# Patient Record
Sex: FEMALE | Race: BLACK OR AFRICAN AMERICAN | Employment: PART TIME | URBAN - METROPOLITAN AREA
[De-identification: names, ages, dates, MRNs, and addresses within clinical notes are randomized per-mention and may not be internally consistent; named-entity substitution may affect disease eponyms.]

---

## 2022-05-19 ENCOUNTER — HOSPITAL ENCOUNTER (EMERGENCY)
Age: 37
Discharge: HOME | End: 2022-05-19
Payer: MEDICAID

## 2022-05-19 ENCOUNTER — APPOINTMENT (OUTPATIENT)
Dept: CT IMAGING | Age: 37
End: 2022-05-19
Payer: MEDICAID

## 2022-05-19 VITALS
TEMPERATURE: 98.3 F | DIASTOLIC BLOOD PRESSURE: 78 MMHG | OXYGEN SATURATION: 100 % | SYSTOLIC BLOOD PRESSURE: 116 MMHG | RESPIRATION RATE: 20 BRPM | HEART RATE: 85 BPM

## 2022-05-19 DIAGNOSIS — R10.9 FLANK PAIN: ICD-10-CM

## 2022-05-19 DIAGNOSIS — N39.0 URINARY TRACT INFECTION WITHOUT HEMATURIA, SITE UNSPECIFIED: Primary | ICD-10-CM

## 2022-05-19 DIAGNOSIS — K59.00 CONSTIPATION, UNSPECIFIED CONSTIPATION TYPE: ICD-10-CM

## 2022-05-19 LAB
A/G RATIO: 1.5 (ref 1.1–2.2)
ALBUMIN SERPL-MCNC: 4.4 G/DL (ref 3.4–5)
ALP BLD-CCNC: 85 U/L (ref 40–129)
ALT SERPL-CCNC: 11 U/L (ref 10–40)
ANION GAP SERPL CALCULATED.3IONS-SCNC: 11 MMOL/L (ref 3–16)
AST SERPL-CCNC: 15 U/L (ref 15–37)
BACTERIA: ABNORMAL /HPF
BASOPHILS ABSOLUTE: 0 K/UL (ref 0–0.2)
BASOPHILS RELATIVE PERCENT: 0.2 %
BILIRUB SERPL-MCNC: 0.3 MG/DL (ref 0–1)
BILIRUBIN URINE: NEGATIVE
BLOOD, URINE: NEGATIVE
BUN BLDV-MCNC: 8 MG/DL (ref 7–20)
CALCIUM SERPL-MCNC: 9.4 MG/DL (ref 8.3–10.6)
CHLORIDE BLD-SCNC: 103 MMOL/L (ref 99–110)
CLARITY: ABNORMAL
CO2: 23 MMOL/L (ref 21–32)
COLOR: YELLOW
CREAT SERPL-MCNC: 0.8 MG/DL (ref 0.6–1.1)
EOSINOPHILS ABSOLUTE: 0.2 K/UL (ref 0–0.6)
EOSINOPHILS RELATIVE PERCENT: 3.9 %
EPITHELIAL CELLS, UA: 51 /HPF (ref 0–5)
GFR AFRICAN AMERICAN: >60
GFR NON-AFRICAN AMERICAN: >60
GLUCOSE BLD-MCNC: 84 MG/DL (ref 70–99)
GLUCOSE URINE: NEGATIVE MG/DL
HCG QUALITATIVE: NEGATIVE
HCT VFR BLD CALC: 38.7 % (ref 36–48)
HEMOGLOBIN: 13.2 G/DL (ref 12–16)
HYALINE CASTS: 0 /LPF (ref 0–8)
KETONES, URINE: NEGATIVE MG/DL
LEUKOCYTE ESTERASE, URINE: ABNORMAL
LYMPHOCYTES ABSOLUTE: 0.8 K/UL (ref 1–5.1)
LYMPHOCYTES RELATIVE PERCENT: 14.3 %
MCH RBC QN AUTO: 31 PG (ref 26–34)
MCHC RBC AUTO-ENTMCNC: 34 G/DL (ref 31–36)
MCV RBC AUTO: 91.1 FL (ref 80–100)
MICROSCOPIC EXAMINATION: YES
MONOCYTES ABSOLUTE: 0.5 K/UL (ref 0–1.3)
MONOCYTES RELATIVE PERCENT: 8.6 %
NEUTROPHILS ABSOLUTE: 4 K/UL (ref 1.7–7.7)
NEUTROPHILS RELATIVE PERCENT: 73 %
NITRITE, URINE: NEGATIVE
PDW BLD-RTO: 13.6 % (ref 12.4–15.4)
PH UA: 6.5 (ref 5–8)
PLATELET # BLD: 373 K/UL (ref 135–450)
PMV BLD AUTO: 7.8 FL (ref 5–10.5)
POTASSIUM SERPL-SCNC: 4.3 MMOL/L (ref 3.5–5.1)
PROTEIN UA: NEGATIVE MG/DL
RBC # BLD: 4.25 M/UL (ref 4–5.2)
RBC UA: 1 /HPF (ref 0–4)
SODIUM BLD-SCNC: 137 MMOL/L (ref 136–145)
SPECIFIC GRAVITY UA: 1.02 (ref 1–1.03)
TOTAL PROTEIN: 7.4 G/DL (ref 6.4–8.2)
URINE REFLEX TO CULTURE: YES
URINE TYPE: ABNORMAL
UROBILINOGEN, URINE: 1 E.U./DL
WBC # BLD: 5.5 K/UL (ref 4–11)
WBC UA: 15 /HPF (ref 0–5)

## 2022-05-19 PROCEDURE — 81001 URINALYSIS AUTO W/SCOPE: CPT

## 2022-05-19 PROCEDURE — 87086 URINE CULTURE/COLONY COUNT: CPT

## 2022-05-19 PROCEDURE — 85025 COMPLETE CBC W/AUTO DIFF WBC: CPT

## 2022-05-19 PROCEDURE — 36415 COLL VENOUS BLD VENIPUNCTURE: CPT

## 2022-05-19 PROCEDURE — 99284 EMERGENCY DEPT VISIT MOD MDM: CPT

## 2022-05-19 PROCEDURE — 84703 CHORIONIC GONADOTROPIN ASSAY: CPT

## 2022-05-19 PROCEDURE — 74176 CT ABD & PELVIS W/O CONTRAST: CPT

## 2022-05-19 PROCEDURE — 80053 COMPREHEN METABOLIC PANEL: CPT

## 2022-05-19 RX ORDER — CEFUROXIME AXETIL 500 MG/1
500 TABLET ORAL 2 TIMES DAILY
Qty: 14 TABLET | Refills: 0 | Status: SHIPPED | OUTPATIENT
Start: 2022-05-19 | End: 2022-05-26

## 2022-05-19 RX ORDER — POLYETHYLENE GLYCOL 3350 17 G/17G
17 POWDER, FOR SOLUTION ORAL DAILY
Qty: 238 G | Refills: 0 | Status: SHIPPED | OUTPATIENT
Start: 2022-05-19 | End: 2022-05-26

## 2022-05-19 RX ORDER — POLYETHYLENE GLYCOL 3350 17 G/17G
17 POWDER, FOR SOLUTION ORAL DAILY
Qty: 238 G | Refills: 0 | Status: SHIPPED | OUTPATIENT
Start: 2022-05-19 | End: 2022-05-19 | Stop reason: SDUPTHER

## 2022-05-19 RX ORDER — SULFAMETHOXAZOLE AND TRIMETHOPRIM 200; 40 MG/5ML; MG/5ML
160 SUSPENSION ORAL 2 TIMES DAILY
COMMUNITY

## 2022-05-19 RX ORDER — DOCUSATE SODIUM 100 MG/1
100 CAPSULE, LIQUID FILLED ORAL 2 TIMES DAILY
Qty: 60 CAPSULE | Refills: 0 | Status: SHIPPED | OUTPATIENT
Start: 2022-05-19 | End: 2022-06-18

## 2022-05-19 RX ORDER — CEFUROXIME AXETIL 500 MG/1
500 TABLET ORAL 2 TIMES DAILY
Qty: 14 TABLET | Refills: 0 | Status: SHIPPED | OUTPATIENT
Start: 2022-05-19 | End: 2022-05-19 | Stop reason: SDUPTHER

## 2022-05-19 RX ORDER — DOCUSATE SODIUM 100 MG/1
100 CAPSULE, LIQUID FILLED ORAL 2 TIMES DAILY
Qty: 60 CAPSULE | Refills: 0 | Status: SHIPPED | OUTPATIENT
Start: 2022-05-19 | End: 2022-05-19 | Stop reason: SDUPTHER

## 2022-05-19 ASSESSMENT — ENCOUNTER SYMPTOMS
DIARRHEA: 0
VOMITING: 0
WHEEZING: 0
SHORTNESS OF BREATH: 0
COUGH: 0
ABDOMINAL PAIN: 1
RHINORRHEA: 0
NAUSEA: 0

## 2022-05-19 ASSESSMENT — PAIN - FUNCTIONAL ASSESSMENT: PAIN_FUNCTIONAL_ASSESSMENT: 0-10

## 2022-05-19 ASSESSMENT — PAIN SCALES - GENERAL: PAINLEVEL_OUTOF10: 10

## 2022-05-20 NOTE — ED NOTES
Pt called ED asking for prescriptions to be called in, RN explained that provider who saw her isn't here but she is more than welcome to be re seen. Prescriptions were sent electronically by CARLY Garza last night but not at the pharmacy according to the pharmacist per pt. RN explained that electronic prescriptions sometimes take a while to cross over to their system. Pt wants to come to ED and have them physically given to her, I explained Tang her provider was not here but she was more than welcome to be re seen. Pt upset and states \"Put someone else on the phone I'm sick of listening to your shit\". Pt was transferred to Dr. Sara Roger assistant.       Kamila Bourgeois RN  05/20/22 1971

## 2022-05-20 NOTE — ED PROVIDER NOTES
[facility]        Pt Name: Sierra Nails  MRN: [id number]  [place] 1985  Date of evaluation: 5/19/2022  Provider: Tayler Flowers PA-C  PCP: Radha George MD  Note Started: 8:39 PM EDT       YAMEL. I have evaluated this patient. My supervising physician was available for consultation. CHIEF COMPLAINT       Chief Complaint   Patient presents with    Flank Pain     Patient states thinks she has a bladder/kidney infection. States urine culture came back positive for UTI. States has been on abx for 4 days. Has back and lower abd pain since last Friday. Took 2.5 mg Oxycodone about 3pm.       HISTORY OF PRESENT ILLNESS   (Location, Timing/Onset, Context/Setting, Quality, Duration, Modifying Factors, Severity, Associated Signs and Symptoms)  Note limiting factors. Chief Complaint: Flank pain, hematuria     Sierra Nails is a 29 y.o. female who presents for evaluation of concern for kidney infection versus kidney stone. Patient states that she was having some lower abdominal pain last week and was concern for urinary tract infection which time she was seen by her OB/GYN. They told her initially that her urinalysis was negative, but, called her Monday and told her the culture came back positive and started on Bactrim. States that she is having worsening of symptoms including backslash flank pain and nausea. Also reports hematuria. No fevers or chills. No vomiting or diarrhea. She has no other complaints or concerns at this time. Nursing Notes were all reviewed and agreed with or any disagreements were addressed in the HPI. REVIEW OF SYSTEMS    (2-9 systems for level 4, 10 or more for level 5)     Review of Systems   Constitutional: Negative for appetite change, chills and fever. HENT: Negative for congestion and rhinorrhea. Respiratory: Negative for cough, shortness of breath and wheezing. Cardiovascular: Negative for chest pain. Gastrointestinal: Positive for abdominal pain. Negative for diarrhea, nausea and vomiting. Genitourinary: Positive for flank pain and hematuria. Negative for difficulty urinating and dysuria. Musculoskeletal: Negative for neck pain and neck stiffness. Skin: Negative for rash. Neurological: Negative for headaches. Positives and Pertinent negatives as per HPI. Except as noted above in the ROS, all other systems were reviewed and negative. PAST MEDICAL HISTORY     Past Medical History:   Diagnosis Date    Headache(784.0)     Seasonal allergies          SURGICAL HISTORY     Past Surgical History:   Procedure Laterality Date    BUNIONECTOMY Bilateral 03.27.2013    SILVER BUNIONECTOMY BILATERAL, RIGHT FOOT FIRST         CURRENTMEDICATIONS       Previous Medications    BUTALBITAL-ACETAMINOPHEN-CAFFEINE (FIORICET) PER TABLET    Take 1 tablet by mouth every 4 hours as needed for Pain for 10 days. BUTALBITAL-ACETAMINOPHEN-CAFFEINE (FIORICET) PER TABLET    Take 1 tablet by mouth every 4 hours as needed for Pain for 10 doses. LEVONORGESTREL (MIRENA) 20 MCG/24HR IUD    1 each by Intrauterine route once. PROMETHAZINE (PHENERGAN) 25 MG TABLET    Take 0.5-1 tablets by mouth every 6 hours as needed for Nausea for 10 doses. SULFAMETHOXAZOLE-TRIMETHOPRIM (BACTRIM;SEPTRA) 200-40 MG/5ML SUSPENSION    Take 160 mg by mouth 2 times daily         ALLERGIES     Doxycycline    FAMILYHISTORY     History reviewed. No pertinent family history.        SOCIAL HISTORY       Social History     Tobacco Use    Smoking status: Never Smoker    Smokeless tobacco: Never Used   Substance Use Topics    Alcohol use: Yes     Comment: rarely    Drug use: No       SCREENINGS    Bronson Coma Scale  Eye Opening: Spontaneous  Best Verbal Response: Oriented  Best Motor Response: Obeys commands  [place] Coma Scale Score: 15        PHYSICAL EXAM    (up to 7 for level 4, 8 or more for level 5)     ED Triage Vitals [05/19/22 1937]   BP Temp Temp Source Pulse Resp SpO2 Height Weight   116/78 98.3 °F (36.8 °C) Oral 85 20 100 % -- --       Physical Exam  Vitals and nursing note reviewed. Constitutional:       Appearance: She is well-developed. She is not diaphoretic. HENT:      Head: Normocephalic and atraumatic. Right Ear: External ear normal.      Left Ear: External ear normal.      Nose: Nose normal.   Eyes:      General:         Right eye: No discharge. Left eye: No discharge. Cardiovascular:      Rate and Rhythm: Normal rate and regular rhythm. Heart sounds: Normal heart sounds. Pulmonary:      Effort: Pulmonary effort is normal. No respiratory distress. Breath sounds: Normal breath sounds. Chest:      Chest wall: No tenderness. Abdominal:      General: There is no distension. Palpations: Abdomen is soft. Tenderness: There is no abdominal tenderness. There is no right CVA tenderness, left CVA tenderness, guarding or rebound. Musculoskeletal:         General: Normal range of motion. Cervical back: Normal range of motion and neck supple. Skin:     General: Skin is warm and dry. Neurological:      Mental Status: She is alert and oriented to person, place, and time.    Psychiatric:         Behavior: Behavior normal.         DIAGNOSTIC RESULTS   LABS:    Labs Reviewed   CBC WITH AUTO DIFFERENTIAL - Abnormal; Notable for the following components:       Result Value    Lymphocytes Absolute 0.8 (*)     All other components within normal limits   URINALYSIS WITH REFLEX TO CULTURE - Abnormal; Notable for the following components:    Clarity, UA CLOUDY (*)     Leukocyte Esterase, Urine SMALL (*)     All other components within normal limits   MICROSCOPIC URINALYSIS - Abnormal; Notable for the following components:    Bacteria, UA 4+ (*)     WBC, UA 15 (*)     Epithelial Cells, UA 51 (*)     All other components within normal limits   CULTURE, URINE COMPREHENSIVE METABOLIC PANEL   HCG, SERUM, QUALITATIVE       When ordered only abnormal lab results are displayed. All other labs were within normal range or not returned as of this dictation. EKG: When ordered, EKG's are interpreted by the Emergency Department Physician in the absence of a cardiologist.  Please see their note for interpretation of EKG. RADIOLOGY:   Non-plain film images such as CT, Ultrasound and MRI are read by the radiologist. Plain radiographic images are visualized and preliminarily interpreted by the ED Provider with the below findings:        Interpretation per the Radiologist below, if available at the time of this note:    CT ABDOMEN PELVIS WO CONTRAST Additional Contrast? None   Final Result   No evidence for any renal calculi or hydronephrosis. Moderate amount of fecal material in colon, suggesting constipation. IUD in the uterus. RECOMMENDATIONS:   Small ovarian simple-appearing cyst. No follow-up imaging is recommended. Reference: JACR 2020 Feb;17(2):248-254           No results found. PROCEDURES   Unless otherwise noted below, none     Procedures    CRITICAL CARE TIME       CONSULTS:  None      EMERGENCY DEPARTMENT COURSE and DIFFERENTIAL DIAGNOSIS/MDM:   Vitals:    Vitals:    05/19/22 1937   BP: 116/78   Pulse: 85   Resp: 20   Temp: 98.3 °F (36.8 °C)   TempSrc: Oral   SpO2: 100%       Patient was given the following medications:  Medications - No data to display      Is this patient to be included in the SEP-1 Core Measure due to severe sepsis or septic shock? No   Exclusion criteria - the patient is NOT to be included for SEP-1 Core Measure due to:  2+ SIRS criteria are not met    Patient presents for evaluation of flank pain and hematuria. On exam, she is resting comfortably in bed talking on the phone in no acute distress and nontoxic. Vitals are stable and she is afebrile.   Abdomen is benign with no focal reproducible tenderness or peritoneal signs.  There is no CVA tenderness. CBC and CMP are unremarkable. Analysis positive for leukocytes, 4+ bacteria and 15 white blood cells. She also has 51 epithelial cells. This could be due to contamination, her, due to continued urinary symptoms and flank pain she will be treated empirically for possible pyelonephritis. Encouraged to discontinue Bactrim and was started on Ceftin. She was also given prescriptions for Colace and MiraLAX due to findings of constipation noted on CT. No evidence of obstructive uropathy. I see nothing that would suggest an acute abdomen at this time. Based on history, physical exam, risk factors, and tests my suspicion for bowel obstruction, incarcerated hernia, acute pancreatitis, intra-abdominal abscess, perforated viscus, diverticulitis, cholecystitis, appendicitis, PID, ovarian torsion, ectopic pregnancy and tubo-ovarian abscess is very low. There is no evidence of peritonitis, sepsis or toxicity at this time. I feel the patient can be managed as an outpatient with follow-up with her family doctor in 24-48 hours. Instructions have been given for the patient to return to the ED for worsening of the pain, high fevers, intractable vomiting, or bleeding. She is agreeable to this plan and stable for discharge at this time. FINAL IMPRESSION      1. Urinary tract infection without hematuria, site unspecified    2. Flank pain    3.  Constipation, unspecified constipation type          DISPOSITION/PLAN   DISPOSITION Decision To Discharge 05/19/2022 10:05:35 PM      PATIENT REFERRED TO:  Liss Lazaro MD  [address] [place] [id number]    Call   For a re-check in  2-3  days    [facility] Emergency Department  [address] [place] [address]  [phone]  Go to   If symptoms worsen      DISCHARGE MEDICATIONS:  New Prescriptions    CEFUROXIME (CEFTIN) 500 MG TABLET    Take 1 tablet by mouth 2 times daily for 7 days    DOCUSATE SODIUM (COLACE) 100 MG CAPSULE    Take 1 capsule by mouth 2 times daily    POLYETHYLENE GLYCOL (MIRALAX) 17 GM/SCOOP POWDER    Take 17 g by mouth daily for 7 days PRN constipation       DISCONTINUED MEDICATIONS:  Discontinued Medications    No medications on file              (Please note that portions of this note were completed with a voice recognition program.  Efforts were made to edit the dictations but occasionally words are mis-transcribed.)    Gloria Ortiz PA-C (electronically signed)           Sofía Quarles PA-C  05/19/22 [id number]

## 2022-05-21 LAB — URINE CULTURE, ROUTINE: NORMAL

## 2023-01-05 ENCOUNTER — OFFICE VISIT (OUTPATIENT)
Dept: FAMILY MEDICINE CLINIC | Age: 38
End: 2023-01-05
Payer: MEDICAID

## 2023-01-05 VITALS
BODY MASS INDEX: 34.12 KG/M2 | DIASTOLIC BLOOD PRESSURE: 68 MMHG | TEMPERATURE: 98.2 F | HEIGHT: 65 IN | WEIGHT: 204.8 LBS | SYSTOLIC BLOOD PRESSURE: 102 MMHG | HEART RATE: 102 BPM | RESPIRATION RATE: 16 BRPM | OXYGEN SATURATION: 98 %

## 2023-01-05 DIAGNOSIS — D36.7 DERMOID CYST OF HEAD: Primary | ICD-10-CM

## 2023-01-05 PROCEDURE — G8417 CALC BMI ABV UP PARAM F/U: HCPCS | Performed by: FAMILY MEDICINE

## 2023-01-05 PROCEDURE — 99203 OFFICE O/P NEW LOW 30 MIN: CPT | Performed by: FAMILY MEDICINE

## 2023-01-05 PROCEDURE — 1036F TOBACCO NON-USER: CPT | Performed by: FAMILY MEDICINE

## 2023-01-05 PROCEDURE — G8427 DOCREV CUR MEDS BY ELIG CLIN: HCPCS | Performed by: FAMILY MEDICINE

## 2023-01-05 PROCEDURE — G8484 FLU IMMUNIZE NO ADMIN: HCPCS | Performed by: FAMILY MEDICINE

## 2023-01-05 SDOH — ECONOMIC STABILITY: FOOD INSECURITY: WITHIN THE PAST 12 MONTHS, THE FOOD YOU BOUGHT JUST DIDN'T LAST AND YOU DIDN'T HAVE MONEY TO GET MORE.: NEVER TRUE

## 2023-01-05 SDOH — ECONOMIC STABILITY: FOOD INSECURITY: WITHIN THE PAST 12 MONTHS, YOU WORRIED THAT YOUR FOOD WOULD RUN OUT BEFORE YOU GOT MONEY TO BUY MORE.: NEVER TRUE

## 2023-01-05 ASSESSMENT — ENCOUNTER SYMPTOMS
SORE THROAT: 0
EYE ITCHING: 0
EYES NEGATIVE: 1
ABDOMINAL PAIN: 0
WHEEZING: 0
SHORTNESS OF BREATH: 0
EYE PAIN: 0
COUGH: 0
SINUS PRESSURE: 0
RHINORRHEA: 0

## 2023-01-05 ASSESSMENT — PATIENT HEALTH QUESTIONNAIRE - PHQ9
2. FEELING DOWN, DEPRESSED OR HOPELESS: 0
SUM OF ALL RESPONSES TO PHQ QUESTIONS 1-9: 0
1. LITTLE INTEREST OR PLEASURE IN DOING THINGS: 0
SUM OF ALL RESPONSES TO PHQ9 QUESTIONS 1 & 2: 0
SUM OF ALL RESPONSES TO PHQ QUESTIONS 1-9: 0

## 2023-01-05 ASSESSMENT — SOCIAL DETERMINANTS OF HEALTH (SDOH): HOW HARD IS IT FOR YOU TO PAY FOR THE VERY BASICS LIKE FOOD, HOUSING, MEDICAL CARE, AND HEATING?: NOT HARD AT ALL

## 2023-01-05 NOTE — PROGRESS NOTES
Migdalia Castro (:  1985) is a 40 y.o. female,New patient, here for evaluation of the following chief complaint(s):  New Patient, Other (Painful knot on L side of head: noticed a few days ago ), and Blood Work          Subjective   SUBJECTIVE/OBJECTIVE:  HPI  79-year-old female patient here to discuss a knot on her head. She states she noticed a small palpable knot on the left side of the head which is causing shooting pain down her neck and upper head. The knot was not there before. She also wants to schedule for a physical.    Review of Systems   Constitutional: Negative. Negative for fatigue. HENT:  Negative for congestion, ear pain, rhinorrhea, sinus pressure and sore throat. Eyes: Negative. Negative for pain and itching. Respiratory:  Negative for cough, shortness of breath and wheezing. Cardiovascular:  Negative for chest pain and palpitations. Gastrointestinal:  Negative for abdominal pain. Genitourinary:  Negative for frequency and urgency. Musculoskeletal:  Negative for gait problem. Skin:  Negative for rash. Kont on the left side of the head   Neurological:  Negative for dizziness and headaches. Psychiatric/Behavioral:  The patient is not nervous/anxious. Objective   Physical Exam  Constitutional:       Appearance: Normal appearance. HENT:      Head: Normocephalic and atraumatic. Comments: Small palpable knot on the left side of the head. Cardiovascular:      Rate and Rhythm: Normal rate and regular rhythm. Pulmonary:      Effort: Pulmonary effort is normal.      Breath sounds: Normal breath sounds. No wheezing. Neurological:      Mental Status: She is alert. Psychiatric:         Mood and Affect: Mood normal.                   ASSESSMENT/PLAN:  1.  Dermoid cyst of head  -     US HEAD NECK SOFT TISSUE THYROID; Future      Return in about 1 week (around 2023) for physical.           An electronic signature was used to authenticate this note.    --Dede Sparks MD     This note was generated completely or in part utilizing Dragon dictation speech recognition software. Occasionally, words are mistranscribed and despite editing, the text may contain inaccuracies due to incorrect word recognition.   If further clarification is needed please contact the office at [phone]

## 2023-01-10 ENCOUNTER — HOSPITAL ENCOUNTER (OUTPATIENT)
Dept: ULTRASOUND IMAGING | Age: 38
Discharge: HOME | End: 2023-01-10
Payer: MEDICAID

## 2023-01-10 DIAGNOSIS — D36.7 DERMOID CYST OF HEAD: ICD-10-CM

## 2023-01-10 PROCEDURE — 76536 US EXAM OF HEAD AND NECK: CPT

## 2023-01-12 ENCOUNTER — OFFICE VISIT (OUTPATIENT)
Dept: FAMILY MEDICINE CLINIC | Age: 38
End: 2023-01-12

## 2023-01-12 VITALS
RESPIRATION RATE: 16 BRPM | DIASTOLIC BLOOD PRESSURE: 74 MMHG | BODY MASS INDEX: 33.89 KG/M2 | HEART RATE: 72 BPM | OXYGEN SATURATION: 99 % | TEMPERATURE: 98.4 F | SYSTOLIC BLOOD PRESSURE: 102 MMHG | HEIGHT: 65 IN | WEIGHT: 203.4 LBS

## 2023-01-12 DIAGNOSIS — Z00.00 ANNUAL PHYSICAL EXAM: ICD-10-CM

## 2023-01-12 DIAGNOSIS — G43.709 CHRONIC MIGRAINE WITHOUT AURA WITHOUT STATUS MIGRAINOSUS, NOT INTRACTABLE: ICD-10-CM

## 2023-01-12 DIAGNOSIS — D36.7 DERMOID CYST OF HEAD: ICD-10-CM

## 2023-01-12 DIAGNOSIS — L30.8 OTHER ECZEMA: Primary | ICD-10-CM

## 2023-01-12 LAB
A/G RATIO: 1.5 (ref 1.1–2.2)
ALBUMIN SERPL-MCNC: 4.1 G/DL (ref 3.4–5)
ALP BLD-CCNC: 66 U/L (ref 40–129)
ALT SERPL-CCNC: 10 U/L (ref 10–40)
ANION GAP SERPL CALCULATED.3IONS-SCNC: 12 MMOL/L (ref 3–16)
AST SERPL-CCNC: 17 U/L (ref 15–37)
BASOPHILS ABSOLUTE: 0 K/UL (ref 0–0.2)
BASOPHILS RELATIVE PERCENT: 0.4 %
BILIRUB SERPL-MCNC: <0.2 MG/DL (ref 0–1)
BUN BLDV-MCNC: 14 MG/DL (ref 7–20)
CALCIUM SERPL-MCNC: 8.6 MG/DL (ref 8.3–10.6)
CHLORIDE BLD-SCNC: 104 MMOL/L (ref 99–110)
CHOLESTEROL, TOTAL: 165 MG/DL (ref 0–199)
CO2: 21 MMOL/L (ref 21–32)
CREAT SERPL-MCNC: 0.7 MG/DL (ref 0.6–1.1)
EOSINOPHILS ABSOLUTE: 0.1 K/UL (ref 0–0.6)
EOSINOPHILS RELATIVE PERCENT: 2.6 %
GFR SERPL CREATININE-BSD FRML MDRD: >60 ML/MIN/{1.73_M2}
GLUCOSE BLD-MCNC: 79 MG/DL (ref 70–99)
HCT VFR BLD CALC: 39.1 % (ref 36–48)
HDLC SERPL-MCNC: 61 MG/DL (ref 40–60)
HEMOGLOBIN: 13 G/DL (ref 12–16)
LDL CHOLESTEROL CALCULATED: 97 MG/DL
LYMPHOCYTES ABSOLUTE: 1.2 K/UL (ref 1–5.1)
LYMPHOCYTES RELATIVE PERCENT: 28 %
MCH RBC QN AUTO: 31 PG (ref 26–34)
MCHC RBC AUTO-ENTMCNC: 33.2 G/DL (ref 31–36)
MCV RBC AUTO: 93.3 FL (ref 80–100)
MONOCYTES ABSOLUTE: 0.2 K/UL (ref 0–1.3)
MONOCYTES RELATIVE PERCENT: 5.7 %
NEUTROPHILS ABSOLUTE: 2.7 K/UL (ref 1.7–7.7)
NEUTROPHILS RELATIVE PERCENT: 63.3 %
PDW BLD-RTO: 13.8 % (ref 12.4–15.4)
PLATELET # BLD: 290 K/UL (ref 135–450)
PMV BLD AUTO: 8.3 FL (ref 5–10.5)
POTASSIUM SERPL-SCNC: 4.5 MMOL/L (ref 3.5–5.1)
RBC # BLD: 4.19 M/UL (ref 4–5.2)
SODIUM BLD-SCNC: 137 MMOL/L (ref 136–145)
TOTAL PROTEIN: 6.8 G/DL (ref 6.4–8.2)
TRIGL SERPL-MCNC: 33 MG/DL (ref 0–150)
TSH SERPL DL<=0.05 MIU/L-ACNC: 1.59 UIU/ML (ref 0.27–4.2)
VLDLC SERPL CALC-MCNC: 7 MG/DL
WBC # BLD: 4.2 K/UL (ref 4–11)

## 2023-01-12 PROCEDURE — 36415 COLL VENOUS BLD VENIPUNCTURE: CPT | Performed by: FAMILY MEDICINE

## 2023-01-12 RX ORDER — ONDANSETRON 8 MG/1
TABLET, ORALLY DISINTEGRATING ORAL
COMMUNITY
Start: 2021-11-23 | End: 2023-01-12 | Stop reason: SDUPTHER

## 2023-01-12 RX ORDER — BUTALBITAL, ACETAMINOPHEN AND CAFFEINE 50; 325; 40 MG/1; MG/1; MG/1
1 TABLET ORAL EVERY 4 HOURS PRN
Qty: 6 TABLET | Refills: 2 | Status: SHIPPED | OUTPATIENT
Start: 2023-01-12

## 2023-01-12 RX ORDER — ONDANSETRON 8 MG/1
4 TABLET, ORALLY DISINTEGRATING ORAL EVERY 8 HOURS PRN
Qty: 12 TABLET | Refills: 2 | Status: SHIPPED | OUTPATIENT
Start: 2023-01-12 | End: 2023-01-24

## 2023-01-12 ASSESSMENT — ENCOUNTER SYMPTOMS
COUGH: 0
SHORTNESS OF BREATH: 0
EYE ITCHING: 0
EYES NEGATIVE: 1
SORE THROAT: 0
WHEEZING: 0
RHINORRHEA: 0
ABDOMINAL PAIN: 0
SINUS PRESSURE: 0
EYE PAIN: 0

## 2023-01-12 NOTE — PROGRESS NOTES
Joaquín Daniel (:  1985) is a 40 y.o. female,Established patient, here for evaluation of the following chief complaint(s):  Cyst (Follow up) and Results          Subjective   SUBJECTIVE/OBJECTIVE:  HPI  40 yr old female here for physical(documented in (separate note) and discuss test results and med refills. Ultrasound Result (most recent):  US HEAD NECK SOFT TISSUE THYROID 01/10/2023    Narrative  EXAMINATION:  THYROID ULTRASOUND    1/10/2023    COMPARISON:  None. HISTORY:  ORDERING SYSTEM PROVIDED HISTORY: Dermoid cyst of head  TECHNOLOGIST PROVIDED HISTORY:  This procedure can be scheduled via [employer]. Access your [employer] account by  visiting [email or web address]. FINDINGS:  Right thyroid lobe:  Measures 4.5 x 1.5 x 1.6 cm    Left thyroid lobe:  Measures 5.0 x 1.5 x 1.2 cm    Isthmus:  Measures 2 mm    Thyroid Gland:  Thyroid gland demonstrates normal echotexture and vascularity. Nodules: No abnormal thyroid nodules are present. Cervical lymphadenopathy: There is no definite pathologic lymphadenopathy. At the back of the left neck, near the hairline, within the subcutaneous fat  just superficial to the strap musculature, there is a nonspecific 1.8 x 1.4 x  0.4 cm elliptical hypoechoic nodule present, demonstrating a few internal  echogenic echoes and no evidence of internal color Doppler flow. Impression  1. Unremarkable sonographic appearance of the thyroid, without an abnormal  thyroid nodule or mass. 2. Nonspecific 1.8 cm elliptical hypoechoic nodule within the subcutaneous  fat of the left lower neck, likely a small benign dermoid cyst, sebaceous  cyst, or lymph node. Migraines - 2-3 times per month /controlled on fiorcet 6 tabs per month and zofran. Nees refills  Contact dermatitis- needs refills on kenalog cream.no acute flare up presently. Review of Systems   Constitutional: Negative. Negative for fatigue.    HENT:  Negative for congestion, ear pain, rhinorrhea, sinus pressure and sore throat. Eyes: Negative. Negative for pain and itching. Respiratory:  Negative for cough, shortness of breath and wheezing. Cardiovascular:  Negative for chest pain and palpitations. Gastrointestinal:  Negative for abdominal pain. Genitourinary:  Negative for frequency and urgency. Musculoskeletal:  Negative for gait problem. Skin:  Negative for rash. Neurological:  Negative for dizziness and headaches. Psychiatric/Behavioral:  The patient is not nervous/anxious. Objective   Physical Exam  Constitutional:       Appearance: Normal appearance. HENT:      Head: Normocephalic and atraumatic. Right Ear: Tympanic membrane, ear canal and external ear normal.      Left Ear: Tympanic membrane, ear canal and external ear normal.      Nose: Nose normal. No congestion or rhinorrhea. Mouth/Throat:      Mouth: Mucous membranes are moist.      Pharynx: Oropharynx is clear. No oropharyngeal exudate or posterior oropharyngeal erythema. Eyes:      Extraocular Movements: Extraocular movements intact. Conjunctiva/sclera: Conjunctivae normal.      Pupils: Pupils are equal, round, and reactive to light. Neck:      Vascular: No carotid bruit. Cardiovascular:      Rate and Rhythm: Normal rate and regular rhythm. Pulses: Normal pulses. Heart sounds: Normal heart sounds, S1 normal and S2 normal. No murmur heard. Pulmonary:      Effort: Pulmonary effort is normal. No respiratory distress. Breath sounds: Normal breath sounds. No wheezing, rhonchi or rales. Abdominal:      General: Bowel sounds are normal. There is no distension. Palpations: Abdomen is soft. Tenderness: There is no abdominal tenderness. There is no right CVA tenderness, left CVA tenderness, guarding or rebound. Musculoskeletal:         General: No swelling or tenderness. Normal range of motion. Cervical back: Normal range of motion and neck supple. No rigidity or tenderness. Right lower leg: No edema. Left lower leg: No edema. Lymphadenopathy:      Cervical: No cervical adenopathy. Skin:     General: Skin is warm and dry. Findings: No bruising or erythema. Neurological:      General: No focal deficit present. Mental Status: She is alert and oriented to person, place, and time. Psychiatric:         Mood and Affect: Mood normal.         Behavior: Behavior normal.                   ASSESSMENT/PLAN:  1. Other eczema  -     triamcinolone (KENALOG) 0.1 % ointment; Apply topically 2 times daily Apply topically 2 times daily. , Topical, 2 TIMES DAILY Starting Thu 1/12/2023, Disp-454 g, R-2, Normal  2. Chronic migraine without aura without status migrainosus, not intractable  -     butalbital-acetaminophen-caffeine (FIORICET) -40 MG per tablet; Take 1 tablet by mouth every 4 hours as needed for Headaches or Migraine, Disp-6 tablet, R-2Normal  -     ondansetron (ZOFRAN-ODT) 8 MG TBDP disintegrating tablet; Take 0.5 tablets by mouth every 8 hours as needed for Nausea or Vomiting, Disp-12 tablet, R-2Normal    3. Dermoid cyst of head  -     Balbir Chandler MD, General Surgery, [facility]      No follow-ups on file. An electronic signature was used to authenticate this note. --Rafaela Cornejo MD     This note was generated completely or in part utilizing Dragon dictation speech recognition software. Occasionally, words are mistranscribed and despite editing, the text may contain inaccuracies due to incorrect word recognition.   If further clarification is needed please contact the office at [phone]

## 2023-01-12 NOTE — PROGRESS NOTES
Ania Sanchez is a 40y.o. year old female here for:    Chief Complaint:    Chief Complaint   Patient presents with    Cyst     Follow up    Results       Subjective: Today, her current concerns include:    Physical  Pap per Gyn  Preventive Services:    Health Maintenance History:  Patient exercises regularly? yes  Diet? ok  Glasses/Eyes: Does not wear prescription glasses or lenses      Other Health Maintenance History:  Sexually active? In the past two weeks have they been bothered by feeling \"down\", depressed or hopeless?  no  In the past two weeks, have they experienced a loss of interest or pleasure in doing things?  no  In the last year, have you fallen more than once or been seriously injured in a fall?  no  Advance Directives no      Health Maintenance Screening:  Diabetes screening: was provided today  Cholesterol screening: was provided today    I  Functional Assessment  1. Do you have problems with hearing?  no  2.   Do you have problems with vision?  no  Past Medical History:   Diagnosis Date    Headache(784.0)     Seasonal allergies      Social History     Tobacco Use    Smoking status: Never    Smokeless tobacco: Never   Vaping Use    Vaping Use: Never used   Substance Use Topics    Alcohol use: Yes     Comment: rarely    Drug use: No     Family History   Problem Relation Age of Onset    Cerebral Aneurysm Father     Stroke Father      Past Surgical History:   Procedure Laterality Date    BREAST REDUCTION SURGERY  03/2021    BUNIONECTOMY Bilateral 03/27/2013    SILVER BUNIONECTOMY BILATERAL, RIGHT FOOT FIRST    WISDOM TOOTH EXTRACTION           Current Outpatient Medications:     butalbital-acetaminophen-caffeine (FIORICET) -40 MG per tablet, Take 1 tablet by mouth every 4 hours as needed for Headaches or Migraine, Disp: 6 tablet, Rfl: 2    ondansetron (ZOFRAN-ODT) 8 MG TBDP disintegrating tablet, Take 0.5 tablets by mouth every 8 hours as needed for Nausea or Vomiting, Disp: 12 tablet, Rfl: 2    triamcinolone (KENALOG) 0.1 % ointment, Apply topically 2 times daily Apply topically 2 times daily. , Disp: 454 g, Rfl: 2    levonorgestrel (MIRENA) IUD 52 mg, 1 each by Intrauterine route once.  , Disp: , Rfl:     promethazine (PHENERGAN) 25 MG tablet, Take 0.5-1 tablets by mouth every 6 hours as needed for Nausea for 10 doses. (Patient not taking: Reported on 1/12/2023), Disp: 10 tablet, Rfl: 0  Allergies   Allergen Reactions    Doxycycline Hives       Review of Systems:  Review of Systems   Constitutional: Negative. Negative for fatigue. HENT:  Negative for congestion, ear pain, rhinorrhea, sinus pressure and sore throat. Eyes: Negative. Negative for pain and itching. Respiratory:  Negative for cough, shortness of breath and wheezing. Cardiovascular:  Negative for chest pain and palpitations. Gastrointestinal:  Negative for abdominal pain. Genitourinary:  Negative for frequency and urgency. Musculoskeletal:  Negative for gait problem. Skin:  Negative for rash. Neurological:  Negative for dizziness and headaches. Psychiatric/Behavioral:  The patient is not nervous/anxious. Objective:    Physical Exam:  Vitals:    01/12/23 0836   BP: 102/74   Pulse: 72   Resp: 16   Temp: 98.4 °F (36.9 °C)   TempSrc: Temporal   SpO2: 99%   Weight: 203 lb 6.4 oz (92.3 kg)   Height: 5' 5\" (1.651 m)     Physical Exam  Constitutional:       Appearance: Normal appearance. HENT:      Head: Normocephalic and atraumatic. Right Ear: Tympanic membrane, ear canal and external ear normal.      Left Ear: Tympanic membrane, ear canal and external ear normal.      Nose: Nose normal. No congestion or rhinorrhea. Mouth/Throat:      Mouth: Mucous membranes are moist.      Pharynx: Oropharynx is clear. No oropharyngeal exudate or posterior oropharyngeal erythema. Eyes:      Extraocular Movements: Extraocular movements intact.       Conjunctiva/sclera: Conjunctivae normal.      Pupils: Pupils are equal, round, and reactive to light. Neck:      Vascular: No carotid bruit. Cardiovascular:      Rate and Rhythm: Normal rate and regular rhythm. Pulses: Normal pulses. Heart sounds: Normal heart sounds, S1 normal and S2 normal. No murmur heard. Pulmonary:      Effort: Pulmonary effort is normal. No respiratory distress. Breath sounds: Normal breath sounds. No wheezing, rhonchi or rales. Abdominal:      General: Bowel sounds are normal. There is no distension. Palpations: Abdomen is soft. Tenderness: There is no abdominal tenderness. There is no right CVA tenderness, left CVA tenderness, guarding or rebound. Musculoskeletal:         General: No swelling or tenderness. Normal range of motion. Cervical back: Normal range of motion and neck supple. No rigidity or tenderness. Right lower leg: No edema. Left lower leg: No edema. Lymphadenopathy:      Cervical: No cervical adenopathy. Skin:     General: Skin is warm and dry. Findings: No bruising or erythema. Neurological:      General: No focal deficit present. Mental Status: She is alert and oriented to person, place, and time. Psychiatric:         Mood and Affect: Mood normal.         Behavior: Behavior normal.     Body mass index is 33.85 kg/m². Labs:  No results found for this or any previous visit (from the past 672 hour(s)). Imaging:  No orders to display         ASSESSMENT & PLAN:    Dane Bloom is a 40y.o. year old female here for an annual exam. The following is a summary of the plan made at this visit:     Annual physical exam  -     Comprehensive Metabolic Panel; Future  -     CBC with Auto Differential; Future  -     TSH; Future  -     Lipid Panel; Future    Counseling  The patient was counseled r regarding diet and exercise. The patient was given information regarding the dangers of smoking and the overuse of alcohol.   The patient was counseled regarding Living Will/Durable Power-Of-.

## 2023-01-17 ENCOUNTER — TELEPHONE (OUTPATIENT)
Dept: FAMILY MEDICINE CLINIC | Age: 38
End: 2023-01-17

## 2023-01-17 DIAGNOSIS — L60.9 NAIL PROBLEM: Primary | ICD-10-CM

## 2023-08-22 DIAGNOSIS — L30.8 OTHER ECZEMA: ICD-10-CM

## 2023-08-22 NOTE — TELEPHONE ENCOUNTER
Medication:   Requested Prescriptions     Pending Prescriptions Disp Refills    triamcinolone (KENALOG) 0.1 % ointment 454 g 2     Sig: Apply topically 2 times daily Apply topically 2 times daily. Last Filled:      Patient Phone Number: [phone] (home)     Last appt: 1/12/2023   Next appt: Visit date not found    Last OARRS: No flowsheet data found.       Please advise on Ondansetron RX request 8mg

## 2023-08-23 RX ORDER — TRIAMCINOLONE ACETONIDE 1 MG/G
OINTMENT TOPICAL 2 TIMES DAILY
Qty: 454 G | Refills: 0 | Status: SHIPPED | OUTPATIENT
Start: 2023-08-23 | End: 2023-10-02 | Stop reason: SDUPTHER

## 2023-08-29 ENCOUNTER — TELEPHONE (OUTPATIENT)
Dept: FAMILY MEDICINE CLINIC | Age: 38
End: 2023-08-29

## 2023-08-29 DIAGNOSIS — G43.709 CHRONIC MIGRAINE WITHOUT AURA WITHOUT STATUS MIGRAINOSUS, NOT INTRACTABLE: ICD-10-CM

## 2023-08-29 NOTE — TELEPHONE ENCOUNTER
Medication:   Requested Prescriptions     Pending Prescriptions Disp Refills    ondansetron (ZOFRAN-ODT) 8 MG TBDP disintegrating tablet 12 tablet 2     Sig: Take 0.5 tablets by mouth every 8 hours as needed for Nausea or Vomiting        Last appt: 1/12/2023     Please advise

## 2023-08-29 NOTE — TELEPHONE ENCOUNTER
Patient called requesting for a refill on Zofran 8 MG. Patient is asking for a pharmacy change. To [facility] in [place]? I could not hear patient very well and would call back to double verify what pharmacy she would like.

## 2023-08-30 RX ORDER — ONDANSETRON 8 MG/1
4 TABLET, ORALLY DISINTEGRATING ORAL EVERY 8 HOURS PRN
Qty: 12 TABLET | Refills: 0 | Status: SHIPPED | OUTPATIENT
Start: 2023-08-30 | End: 2023-09-11

## 2023-08-30 RX ORDER — ONDANSETRON 8 MG/1
4 TABLET, ORALLY DISINTEGRATING ORAL EVERY 8 HOURS PRN
Qty: 12 TABLET | Refills: 0 | Status: CANCELLED | OUTPATIENT
Start: 2023-08-30 | End: 2023-09-11

## 2023-10-02 ENCOUNTER — OFFICE VISIT (OUTPATIENT)
Dept: PRIMARY CARE CLINIC | Age: 38
End: 2023-10-02
Payer: MEDICAID

## 2023-10-02 VITALS
TEMPERATURE: 98.1 F | HEART RATE: 86 BPM | BODY MASS INDEX: 33.62 KG/M2 | HEIGHT: 65 IN | DIASTOLIC BLOOD PRESSURE: 85 MMHG | SYSTOLIC BLOOD PRESSURE: 112 MMHG | OXYGEN SATURATION: 100 % | RESPIRATION RATE: 18 BRPM | WEIGHT: 201.8 LBS

## 2023-10-02 DIAGNOSIS — G43.019 INTRACTABLE MIGRAINE WITHOUT AURA AND WITHOUT STATUS MIGRAINOSUS: Primary | ICD-10-CM

## 2023-10-02 DIAGNOSIS — L30.8 OTHER ECZEMA: ICD-10-CM

## 2023-10-02 PROBLEM — L20.82 FLEXURAL ECZEMA: Status: ACTIVE | Noted: 2019-11-02

## 2023-10-02 PROCEDURE — G8417 CALC BMI ABV UP PARAM F/U: HCPCS | Performed by: FAMILY MEDICINE

## 2023-10-02 PROCEDURE — 99204 OFFICE O/P NEW MOD 45 MIN: CPT | Performed by: FAMILY MEDICINE

## 2023-10-02 PROCEDURE — G8484 FLU IMMUNIZE NO ADMIN: HCPCS | Performed by: FAMILY MEDICINE

## 2023-10-02 PROCEDURE — 1036F TOBACCO NON-USER: CPT | Performed by: FAMILY MEDICINE

## 2023-10-02 PROCEDURE — G8427 DOCREV CUR MEDS BY ELIG CLIN: HCPCS | Performed by: FAMILY MEDICINE

## 2023-10-02 RX ORDER — PROMETHAZINE HYDROCHLORIDE 25 MG/1
12.5-25 TABLET ORAL EVERY 6 HOURS PRN
Qty: 10 TABLET | Refills: 3 | Status: SHIPPED | OUTPATIENT
Start: 2023-10-02

## 2023-10-02 RX ORDER — TRIAMCINOLONE ACETONIDE 1 MG/G
OINTMENT TOPICAL 2 TIMES DAILY
Qty: 454 G | Refills: 2 | Status: SHIPPED | OUTPATIENT
Start: 2023-10-02

## 2023-10-02 RX ORDER — IBUPROFEN 800 MG/1
800 TABLET ORAL EVERY 8 HOURS PRN
COMMUNITY
Start: 2017-09-30

## 2023-10-02 RX ORDER — AMOXICILLIN 500 MG/1
TABLET, FILM COATED ORAL EVERY 8 HOURS
COMMUNITY

## 2023-10-02 SDOH — ECONOMIC STABILITY: FOOD INSECURITY: WITHIN THE PAST 12 MONTHS, THE FOOD YOU BOUGHT JUST DIDN'T LAST AND YOU DIDN'T HAVE MONEY TO GET MORE.: NEVER TRUE

## 2023-10-02 SDOH — ECONOMIC STABILITY: HOUSING INSECURITY
IN THE LAST 12 MONTHS, WAS THERE A TIME WHEN YOU DID NOT HAVE A STEADY PLACE TO SLEEP OR SLEPT IN A SHELTER (INCLUDING NOW)?: NO

## 2023-10-02 SDOH — ECONOMIC STABILITY: FOOD INSECURITY: WITHIN THE PAST 12 MONTHS, YOU WORRIED THAT YOUR FOOD WOULD RUN OUT BEFORE YOU GOT MONEY TO BUY MORE.: NEVER TRUE

## 2023-10-02 SDOH — ECONOMIC STABILITY: INCOME INSECURITY: HOW HARD IS IT FOR YOU TO PAY FOR THE VERY BASICS LIKE FOOD, HOUSING, MEDICAL CARE, AND HEATING?: NOT HARD AT ALL

## 2023-10-02 NOTE — PROGRESS NOTES
Subjective:   Patient ID: Rani Anderson is a 41 y.o. female here today to establish care. Previously under the care of Adell Sicard MD  HPI by clinical support staff:   Chief Complaint   Patient presents with    [facility] form: located on top in black basket         Preliminary data above this line collected by clinical support staff.    ______________________________________________________________________  HPI by Provider:   HPI   Patient presents to establish care. History reviewed and updated with patient today. History of migraines since Age  21 years   Sometimes back of neck, occiput or temples  Quality-Tightness, throbbing ,dull varies. Lasts from 2-7 days. Pain rated 9/10 - tries to stay in dark. Frequency twice weekly but varies. Usually phonosensitive and Photosensitive. Has Nausea no vomiting. No blurry vision or aura, no blind spots. Eye exam this year normal.  Father history of migraines   Tried sumatriptan didn't like it. Takes Fioricet 2 tabs twice a week. Data above this line collected by Provider. Patient's medications, allergies, past medical, surgical, social and family histories were reviewed and updated as appropriate.   Patient Care Team:  Magalie Smith MD as PCP - General (Family Medicine)  Magalie Smith MD as PCP - Empaneled Provider    Allergies   Allergen Reactions    Doxycycline Hives    Hornet Venom Hives    Wasp Venom Protein Hives    Yellow Jacket Venom Hives       Current Outpatient Medications on File Prior to Visit   Medication Sig Dispense Refill    ibuprofen (ADVIL;MOTRIN) 800 MG tablet Take 1 tablet by mouth every 8 hours as needed      butalbital-acetaminophen-caffeine (FIORICET) -40 MG per tablet Take 1 tablet by mouth every 4 hours as needed for Headaches or Migraine 6 tablet 2    levonorgestrel (MIRENA) IUD 52 mg 1 each by IntraUTERine route once      amoxicillin (AMOXIL) 500 MG tablet every 8 (eight) hours       No current facility-administered

## 2023-10-04 ASSESSMENT — ENCOUNTER SYMPTOMS
CONSTIPATION: 0
ABDOMINAL PAIN: 0
SHORTNESS OF BREATH: 0
DIARRHEA: 0
SORE THROAT: 0
CHEST TIGHTNESS: 0
RHINORRHEA: 0

## 2023-12-13 ENCOUNTER — TELEPHONE (OUTPATIENT)
Dept: PRIMARY CARE CLINIC | Age: 38
End: 2023-12-13

## 2023-12-13 DIAGNOSIS — G43.709 CHRONIC MIGRAINE WITHOUT AURA WITHOUT STATUS MIGRAINOSUS, NOT INTRACTABLE: ICD-10-CM

## 2023-12-13 NOTE — TELEPHONE ENCOUNTER
Medication:   Requested Prescriptions     Pending Prescriptions Disp Refills    ondansetron (ZOFRAN-ODT) 8 MG TBDP disintegrating tablet 12 tablet 0     Sig: Take 0.5 tablets by mouth every 8 hours as needed for Nausea or Vomiting        Last Filled:  8/30/23 #12 0 refills    Patient Phone Number: [phone] (home)     Last appt: 10/2/2023   Next appt: Visit date not found

## 2023-12-13 NOTE — TELEPHONE ENCOUNTER
Medication and Quantity requested:   ondansetron (ZOFRAN-ODT) 8 MG TBDP disintegrating tablet       Last Visit  10/2/23    Pharmacy and phone number updated in EPIC:  yes  [facility]

## 2023-12-14 RX ORDER — ONDANSETRON 8 MG/1
4 TABLET, ORALLY DISINTEGRATING ORAL EVERY 8 HOURS PRN
Qty: 30 TABLET | Refills: 1 | Status: SHIPPED | OUTPATIENT
Start: 2023-12-14

## 2023-12-27 ENCOUNTER — OFFICE VISIT (OUTPATIENT)
Dept: PRIMARY CARE CLINIC | Age: 38
End: 2023-12-27
Payer: MEDICAID

## 2023-12-27 VITALS
BODY MASS INDEX: 34.47 KG/M2 | HEART RATE: 72 BPM | HEIGHT: 65 IN | TEMPERATURE: 98.2 F | WEIGHT: 206.9 LBS | DIASTOLIC BLOOD PRESSURE: 80 MMHG | RESPIRATION RATE: 16 BRPM | OXYGEN SATURATION: 99 % | SYSTOLIC BLOOD PRESSURE: 113 MMHG

## 2023-12-27 DIAGNOSIS — H69.92 DYSFUNCTION OF LEFT EUSTACHIAN TUBE: Primary | ICD-10-CM

## 2023-12-27 PROCEDURE — G8417 CALC BMI ABV UP PARAM F/U: HCPCS | Performed by: NURSE PRACTITIONER

## 2023-12-27 PROCEDURE — G8484 FLU IMMUNIZE NO ADMIN: HCPCS | Performed by: NURSE PRACTITIONER

## 2023-12-27 PROCEDURE — 99213 OFFICE O/P EST LOW 20 MIN: CPT | Performed by: NURSE PRACTITIONER

## 2023-12-27 PROCEDURE — G8427 DOCREV CUR MEDS BY ELIG CLIN: HCPCS | Performed by: NURSE PRACTITIONER

## 2023-12-27 PROCEDURE — 1036F TOBACCO NON-USER: CPT | Performed by: NURSE PRACTITIONER

## 2023-12-27 NOTE — PROGRESS NOTES
Brandi Rose (:  1985) is a 38 y.o. female,Established patient, here for evaluation of the following chief complaint(s):  Other (Left ear pain, sore this past week. She thinks she has an ear infection possibly)         ASSESSMENT/PLAN:  1. Dysfunction of left eustachian tube  Advised to monitor and use nasal spray as needed.    No follow-ups on file.         Subjective   SUBJECTIVE/OBJECTIVE:  HPI  Patient complains of ear pain and possible ear infection. Symptoms include left ear pain. Onset of symptoms was 2 days ago, gradually worsening since that time.   Review of Systems   Constitutional: Negative.    HENT:  Positive for ear pain.    Eyes: Negative.    Respiratory: Negative.     Cardiovascular: Negative.    Gastrointestinal: Negative.    Endocrine: Negative.    Genitourinary: Negative.    Musculoskeletal: Negative.    Skin: Negative.    Neurological: Negative.    Hematological: Negative.    Psychiatric/Behavioral: Negative.            Objective   Physical Exam  HENT:      Right Ear: Tympanic membrane and ear canal normal.      Left Ear: Tympanic membrane and ear canal normal.      Nose: Nose normal.      Mouth/Throat:      Mouth: Mucous membranes are moist.   Eyes:      Extraocular Movements: Extraocular movements intact.   Cardiovascular:      Rate and Rhythm: Normal rate.      Pulses: Normal pulses.      Heart sounds: Normal heart sounds.   Pulmonary:      Effort: Pulmonary effort is normal.      Breath sounds: Normal breath sounds.   Abdominal:      General: Bowel sounds are normal.      Palpations: Abdomen is soft.   Musculoskeletal:         General: Normal range of motion.      Cervical back: Normal range of motion.   Skin:     General: Skin is warm.   Neurological:      General: No focal deficit present.      Mental Status: She is alert and oriented to person, place, and time.   Psychiatric:         Mood and Affect: Mood normal.         Behavior: Behavior normal.         Thought Content:

## 2024-01-05 DIAGNOSIS — G43.709 CHRONIC MIGRAINE WITHOUT AURA WITHOUT STATUS MIGRAINOSUS, NOT INTRACTABLE: ICD-10-CM

## 2024-01-05 RX ORDER — BUTALBITAL, ACETAMINOPHEN AND CAFFEINE 50; 325; 40 MG/1; MG/1; MG/1
1 TABLET ORAL EVERY 4 HOURS PRN
Qty: 6 TABLET | Refills: 2 | Status: CANCELLED | OUTPATIENT
Start: 2024-01-05

## 2024-01-05 RX ORDER — BUTALBITAL, ACETAMINOPHEN AND CAFFEINE 50; 325; 40 MG/1; MG/1; MG/1
1 TABLET ORAL EVERY 4 HOURS PRN
Qty: 30 TABLET | Refills: 2 | Status: SHIPPED | OUTPATIENT
Start: 2024-01-05

## 2024-01-05 RX ORDER — IBUPROFEN 800 MG/1
800 TABLET ORAL EVERY 8 HOURS PRN
Qty: 120 TABLET | Refills: 0 | Status: SHIPPED | OUTPATIENT
Start: 2024-01-05

## 2024-01-05 NOTE — TELEPHONE ENCOUNTER
Medication:   Requested Prescriptions     Pending Prescriptions Disp Refills    ibuprofen (ADVIL;MOTRIN) 800 MG tablet 120 tablet      Sig: Take 1 tablet by mouth every 8 hours as needed      Last appt: 12/27/2023   Next appt: Visit date not found

## 2024-01-18 ASSESSMENT — ENCOUNTER SYMPTOMS
EYES NEGATIVE: 1
RESPIRATORY NEGATIVE: 1
GASTROINTESTINAL NEGATIVE: 1

## 2024-03-07 ENCOUNTER — OFFICE VISIT (OUTPATIENT)
Dept: PRIMARY CARE CLINIC | Age: 39
End: 2024-03-07
Payer: MEDICAID

## 2024-03-07 DIAGNOSIS — A09 TRAVELER'S DIARRHEA: Primary | ICD-10-CM

## 2024-03-07 DIAGNOSIS — Z29.89 NEED FOR MALARIA PROPHYLAXIS: ICD-10-CM

## 2024-03-07 PROCEDURE — G8417 CALC BMI ABV UP PARAM F/U: HCPCS | Performed by: FAMILY MEDICINE

## 2024-03-07 PROCEDURE — G8427 DOCREV CUR MEDS BY ELIG CLIN: HCPCS | Performed by: FAMILY MEDICINE

## 2024-03-07 PROCEDURE — 1036F TOBACCO NON-USER: CPT | Performed by: FAMILY MEDICINE

## 2024-03-07 PROCEDURE — G8484 FLU IMMUNIZE NO ADMIN: HCPCS | Performed by: FAMILY MEDICINE

## 2024-03-07 PROCEDURE — 99214 OFFICE O/P EST MOD 30 MIN: CPT | Performed by: FAMILY MEDICINE

## 2024-03-07 RX ORDER — AZITHROMYCIN 500 MG/1
1000 TABLET, FILM COATED ORAL ONCE
Qty: 4 TABLET | Refills: 0 | Status: SHIPPED | OUTPATIENT
Start: 2024-03-07 | End: 2024-03-07

## 2024-03-07 RX ORDER — ATOVAQUONE AND PROGUANIL HYDROCHLORIDE 250; 100 MG/1; MG/1
1 TABLET, FILM COATED ORAL DAILY
Qty: 22 TABLET | Refills: 0 | Status: SHIPPED | OUTPATIENT
Start: 2024-03-07

## 2024-03-07 ASSESSMENT — ENCOUNTER SYMPTOMS
DIARRHEA: 0
SHORTNESS OF BREATH: 0
RHINORRHEA: 0
CONSTIPATION: 0
ABDOMINAL PAIN: 0
CHEST TIGHTNESS: 0
SORE THROAT: 0

## 2024-03-07 ASSESSMENT — PATIENT HEALTH QUESTIONNAIRE - PHQ9
SUM OF ALL RESPONSES TO PHQ QUESTIONS 1-9: 0
SUM OF ALL RESPONSES TO PHQ QUESTIONS 1-9: 0
1. LITTLE INTEREST OR PLEASURE IN DOING THINGS: 0
SUM OF ALL RESPONSES TO PHQ QUESTIONS 1-9: 0
SUM OF ALL RESPONSES TO PHQ QUESTIONS 1-9: 0
SUM OF ALL RESPONSES TO PHQ9 QUESTIONS 1 & 2: 0
2. FEELING DOWN, DEPRESSED OR HOPELESS: 0

## 2024-03-07 NOTE — PROGRESS NOTES
Subjective:   Patient ID: Brandi Rose is a 38 y.o. female.  HPI by clinical support staff:   Chief Complaint   Patient presents with    Medication Check      Preliminary data above this line collected by clinical support staff.    ______________________________________________________________________  HPI by Provider:   HPI   Patient presents virtually for travel advise- traveling to [place] in 5 days for 2 weeks. Wants to know about vaccines/ medication  requirements. Going with mother and sister. Does not vaccinate. Medication allergies updated. Also wants travelers diarrhea  medication.   Data above this line collected by Provider.    Patient's medications, allergies, past medical, surgical, social and family histories were reviewed and updated as appropriate.  Patient Care Team:  Joya Damon MD as PCP - General (Family Medicine)  [facility]  Current Outpatient Medications on File Prior to Visit   Medication Sig Dispense Refill    ibuprofen (ADVIL;MOTRIN) 800 MG tablet Take 1 tablet by mouth every 8 hours as needed for Pain 120 tablet 0    butalbital-acetaminophen-caffeine (FIORICET) -40 MG per tablet Take 1 tablet by mouth every 4 hours as needed for Headaches or Migraine 30 tablet 2    ondansetron (ZOFRAN-ODT) 8 MG TBDP disintegrating tablet Take 0.5 tablets by mouth every 8 hours as needed for Nausea or Vomiting 30 tablet 1    promethazine (PHENERGAN) 25 MG tablet Take 0.5-1 tablets by mouth every 6 hours as needed for Nausea 10 tablet 3    triamcinolone (KENALOG) 0.1 % ointment Apply topically 2 times daily Apply topically 2 times daily. 454 g 2    levonorgestrel (MIRENA) IUD 52 mg 1 each by IntraUTERine route once       No current facility-administered medications on file prior to visit.     Review of Systems   Constitutional:  Negative for activity change, appetite change, fatigue and fever.   HENT:  Negative for congestion, rhinorrhea and sore

## 2024-03-08 ENCOUNTER — OFFICE VISIT (OUTPATIENT)
Age: 39
End: 2024-03-08

## 2024-03-08 VITALS
HEART RATE: 105 BPM | OXYGEN SATURATION: 95 % | DIASTOLIC BLOOD PRESSURE: 73 MMHG | WEIGHT: 205.8 LBS | TEMPERATURE: 100.5 F | BODY MASS INDEX: 34.29 KG/M2 | HEIGHT: 65 IN | SYSTOLIC BLOOD PRESSURE: 102 MMHG

## 2024-03-08 DIAGNOSIS — J10.1 INFLUENZA B: Primary | ICD-10-CM

## 2024-03-08 DIAGNOSIS — G43.909 MIGRAINE SYNDROME: ICD-10-CM

## 2024-03-08 DIAGNOSIS — R50.9 FEVER, UNSPECIFIED FEVER CAUSE: ICD-10-CM

## 2024-03-08 DIAGNOSIS — L30.8 OTHER ECZEMA: ICD-10-CM

## 2024-03-08 LAB
INFLUENZA A ANTIGEN, POC: NEGATIVE
INFLUENZA B ANTIGEN, POC: POSITIVE
Lab: NORMAL
PERFORMING INSTRUMENT: NORMAL
QC PASS/FAIL: NORMAL
SARS-COV-2, POC: NORMAL

## 2024-03-08 RX ORDER — OSELTAMIVIR PHOSPHATE 75 MG/1
75 CAPSULE ORAL 2 TIMES DAILY
Qty: 10 CAPSULE | Refills: 0 | Status: SHIPPED | OUTPATIENT
Start: 2024-03-08 | End: 2024-03-13

## 2024-03-08 RX ORDER — KETOROLAC TROMETHAMINE 30 MG/ML
30 INJECTION, SOLUTION INTRAMUSCULAR; INTRAVENOUS ONCE
Status: COMPLETED | OUTPATIENT
Start: 2024-03-08 | End: 2024-03-08

## 2024-03-08 RX ORDER — TRIAMCINOLONE ACETONIDE 1 MG/G
OINTMENT TOPICAL 2 TIMES DAILY
Qty: 454 G | Refills: 2 | Status: SHIPPED | OUTPATIENT
Start: 2024-03-08

## 2024-03-08 RX ADMIN — KETOROLAC TROMETHAMINE 30 MG: 30 INJECTION, SOLUTION INTRAMUSCULAR; INTRAVENOUS at 16:40

## 2024-03-08 ASSESSMENT — ENCOUNTER SYMPTOMS
ABDOMINAL PAIN: 0
RHINORRHEA: 1
SHORTNESS OF BREATH: 0
VOMITING: 0
DIARRHEA: 0
COUGH: 1
TROUBLE SWALLOWING: 0
NAUSEA: 0

## 2024-03-08 ASSESSMENT — VISUAL ACUITY: OU: 1

## 2024-03-08 NOTE — PATIENT INSTRUCTIONS
New Prescriptions    OSELTAMIVIR (TAMIFLU) 75 MG CAPSULE    Take 1 capsule by mouth 2 times daily for 5 days     Increase fluid intake.  Ibuprofen and Tylenol for fever or pain (If not contraindicated).  Follow up with PCP in 5 days or sooner for worsening symptoms.

## 2024-03-08 NOTE — TELEPHONE ENCOUNTER
Medication and Quantity requested: triamcinolone (KENALOG) 0.1 % ointmen      Last Visit  3/7/24    Pharmacy and phone number updated in [facility]:  yes  [facility]

## 2024-03-08 NOTE — PROGRESS NOTES
Brandi Rose (:  1985) is a 38 y.o. female, New patient, here for evaluation of the following chief complaint(s):  Cough, Fever, and Headache (Pt here for cough, headache and fever x 2-3 days.)    ASSESSMENT/PLAN:    ICD-10-CM    1. Influenza B  J10.1 POCT Influenza A/B Antigen (BD Veritor)     oseltamivir (TAMIFLU) 75 MG capsule      2. Fever, unspecified fever cause  R50.9 POCT Influenza A/B Antigen (BD Veritor)     POCT COVID-19, Antigen      3. Migraine syndrome  G43.909 oseltamivir (TAMIFLU) 75 MG capsule     ketorolac (TORADOL) injection 30 mg        POC testing: Discussed result(s) with patient  Results for POC orders placed in visit on 24   POCT Influenza A/B Antigen (BD Veritor)   Result Value Ref Range    Inflenza A Ag NEGATIVE     Influenza B Ag POSITIVE      Ddx includes: URI, Influenza A/B, Sinusitis, Pneumonia, Migraine   Disposition: Pt is 37yo female here with URI s/s. Febrile with tachycardia. VSS. Physical Exam as below. Flu B positive. Tamiflu rx sent to her pharmacy.    Migraine: Pt appears to have a migraine headache based on history and examination. Headache typical for patient in regards to location, severity, and associated symptoms. No neurological deficits or traumatic etiology noted at this time. No red flags. 30mg IM Toradol given in the office and pt was monitored for 15min with no complications.   Reviewed AVS with patient. All questions answered. If symptoms worsen go to the Emergency Department. Follow up in clinic or with PCP as needed. Patient is agreeable with plan.      SUBJECTIVE/OBJECTIVE:  37yo female here with C/O fever, chills, cough, congestion, bodyaches and migraine. Endorses sick contacts. Kids have same URI s/s at home. Care prior to arrival includes Tylenol.     Pt c/o her Migraine acting up and wants some pain relief for her HA. Denies any other neurologic symptoms. No change in ha pattern. Takes Fioricet for her Migraines.       History provided by:

## 2024-04-04 ENCOUNTER — OFFICE VISIT (OUTPATIENT)
Dept: PRIMARY CARE CLINIC | Age: 39
End: 2024-04-04
Payer: MEDICAID

## 2024-04-04 VITALS
WEIGHT: 204.2 LBS | HEART RATE: 75 BPM | RESPIRATION RATE: 16 BRPM | BODY MASS INDEX: 34.02 KG/M2 | OXYGEN SATURATION: 100 % | TEMPERATURE: 97.5 F | HEIGHT: 65 IN | SYSTOLIC BLOOD PRESSURE: 117 MMHG | DIASTOLIC BLOOD PRESSURE: 74 MMHG

## 2024-04-04 DIAGNOSIS — G43.709 CHRONIC MIGRAINE WITHOUT AURA WITHOUT STATUS MIGRAINOSUS, NOT INTRACTABLE: ICD-10-CM

## 2024-04-04 PROCEDURE — G8417 CALC BMI ABV UP PARAM F/U: HCPCS | Performed by: NURSE PRACTITIONER

## 2024-04-04 PROCEDURE — 99213 OFFICE O/P EST LOW 20 MIN: CPT | Performed by: NURSE PRACTITIONER

## 2024-04-04 PROCEDURE — 1036F TOBACCO NON-USER: CPT | Performed by: NURSE PRACTITIONER

## 2024-04-04 PROCEDURE — G8427 DOCREV CUR MEDS BY ELIG CLIN: HCPCS | Performed by: NURSE PRACTITIONER

## 2024-04-04 RX ORDER — BUTALBITAL, ACETAMINOPHEN AND CAFFEINE 50; 325; 40 MG/1; MG/1; MG/1
1 TABLET ORAL EVERY 4 HOURS PRN
Qty: 30 TABLET | Refills: 2 | Status: SHIPPED | OUTPATIENT
Start: 2024-04-04

## 2024-04-04 RX ORDER — ONDANSETRON 8 MG/1
4 TABLET, ORALLY DISINTEGRATING ORAL EVERY 8 HOURS PRN
Qty: 30 TABLET | Refills: 1 | Status: SHIPPED | OUTPATIENT
Start: 2024-04-04

## 2024-04-04 NOTE — PROGRESS NOTES
Brandi Rose (:  1985) is a 38 y.o. female,Established patient, here for evaluation of the following chief complaint(s):  FMLA (Form)         ASSESSMENT/PLAN:  1. Chronic migraine without aura without status migrainosus, not intractable  -     ondansetron (ZOFRAN-ODT) 8 MG TBDP disintegrating tablet; Take 0.5 tablets by mouth every 8 hours as needed for Nausea or Vomiting, Disp-30 tablet, R-1Normal  -     butalbital-acetaminophen-caffeine (FIORICET) -40 MG per tablet; Take 1 tablet by mouth every 4 hours as needed for Headaches or Migraine, Disp-30 tablet, R-2Normal      No follow-ups on file.         Subjective   SUBJECTIVE/OBJECTIVE:  HPI  Patient presents for medication refill for migraines and also  for short term disability for migraines.  Patient stated that she has been having frequent migraines and needs short-term disability forms filled out for when she has episodes to help accommodate her.  Review of Systems   Constitutional: Negative.    HENT: Negative.     Eyes: Negative.    Respiratory: Negative.     Cardiovascular: Negative.    Gastrointestinal: Negative.    Endocrine: Negative.    Genitourinary: Negative.    Musculoskeletal: Negative.    Skin: Negative.    Neurological:  Positive for headaches.   Hematological: Negative.    Psychiatric/Behavioral: Negative.            Objective   Physical Exam  HENT:      Right Ear: Tympanic membrane and ear canal normal.      Left Ear: Tympanic membrane and ear canal normal.      Nose: Nose normal.      Mouth/Throat:      Mouth: Mucous membranes are moist.   Eyes:      Extraocular Movements: Extraocular movements intact.   Cardiovascular:      Rate and Rhythm: Normal rate.      Pulses: Normal pulses.      Heart sounds: Normal heart sounds.   Pulmonary:      Effort: Pulmonary effort is normal.      Breath sounds: Normal breath sounds.   Abdominal:      General: Bowel sounds are normal.      Palpations: Abdomen is soft.   Musculoskeletal:

## 2024-04-19 ASSESSMENT — ENCOUNTER SYMPTOMS
RESPIRATORY NEGATIVE: 1
EYES NEGATIVE: 1
GASTROINTESTINAL NEGATIVE: 1

## 2024-05-28 ENCOUNTER — TELEPHONE (OUTPATIENT)
Dept: ADMINISTRATIVE | Age: 39
End: 2024-05-28

## 2024-05-28 NOTE — TELEPHONE ENCOUNTER
Submitted PA for Promethazine HCl 25MG tablets  Via CMM Key: [id number]  STATUS: PA Not Required    If this requires a response please respond to the pool ( P MHCX [facility] MEDICATION PRE-AUTH).      Thank you please advise patient.

## 2024-06-18 ENCOUNTER — TELEMEDICINE (OUTPATIENT)
Dept: PRIMARY CARE CLINIC | Age: 39
End: 2024-06-18
Payer: MEDICAID

## 2024-06-18 DIAGNOSIS — L70.0 ACNE VULGARIS: Primary | ICD-10-CM

## 2024-06-18 PROCEDURE — 1036F TOBACCO NON-USER: CPT | Performed by: NURSE PRACTITIONER

## 2024-06-18 PROCEDURE — 99213 OFFICE O/P EST LOW 20 MIN: CPT | Performed by: NURSE PRACTITIONER

## 2024-06-18 PROCEDURE — G8417 CALC BMI ABV UP PARAM F/U: HCPCS | Performed by: NURSE PRACTITIONER

## 2024-06-18 PROCEDURE — G8427 DOCREV CUR MEDS BY ELIG CLIN: HCPCS | Performed by: NURSE PRACTITIONER

## 2024-06-18 RX ORDER — SPIRONOLACTONE 25 MG/1
25 TABLET ORAL 2 TIMES DAILY
Qty: 90 TABLET | Refills: 1 | Status: SHIPPED | OUTPATIENT
Start: 2024-06-18

## 2024-06-18 ASSESSMENT — ENCOUNTER SYMPTOMS
GASTROINTESTINAL NEGATIVE: 1
RESPIRATORY NEGATIVE: 1
ROS SKIN COMMENTS: ACNE
EYES NEGATIVE: 1

## 2024-06-18 NOTE — PROGRESS NOTES
Brandi Rose, was evaluated through a synchronous (real-time) audio-video encounter. The patient (or guardian if applicable) is aware that this is a billable service, which includes applicable co-pays. This Virtual Visit was conducted with patient's (and/or legal guardian's) consent. Patient identification was verified, and a caregiver was present when appropriate.   The patient was located at Home: [address] [facility] [address] [facility] [address]  Provider was located at Home (Appt Dept State): [place]  Confirm you are appropriately licensed, registered, or certified to deliver care in the state where the patient is located as indicated above. If you are not or unsure, please re-schedule the visit: Yes, I confirm.     Brandi Rose (:  1985) is a Established patient, presenting virtually for evaluation of the following:    Assessment & Plan   Below is the assessment and plan developed based on review of pertinent history, physical exam, labs, studies, and medications.  1. Acne vulgaris  -     spironolactone (ALDACTONE) 25 MG tablet; Take 1 tablet by mouth 2 times daily, Disp-90 tablet, R-1Normal    No follow-ups on file.       Subjective   HPI  Patient presents with complaints of acne.  She stated that she has always struggled with acne but lately it has been worsening and all over-the-counter treatment options have been exhausted without any relief.  She wanted to try spironolactone and wanted to see if it was contraindicated with the current medications that she is on  Review of Systems   Constitutional: Negative.    HENT: Negative.     Eyes: Negative.    Respiratory: Negative.     Cardiovascular: Negative.    Gastrointestinal: Negative.    Endocrine: Negative.    Genitourinary: Negative.    Musculoskeletal: Negative.    Skin: Negative.         Acne   Neurological: Negative.    Hematological: Negative.    Psychiatric/Behavioral: Negative.            Objective   Patient-Reported Vitals  No data recorded

## 2024-06-23 ENCOUNTER — HOSPITAL ENCOUNTER (EMERGENCY)
Age: 39
Discharge: HOME | End: 2024-06-23
Attending: STUDENT IN AN ORGANIZED HEALTH CARE EDUCATION/TRAINING PROGRAM
Payer: MEDICAID

## 2024-06-23 VITALS
HEART RATE: 93 BPM | DIASTOLIC BLOOD PRESSURE: 83 MMHG | OXYGEN SATURATION: 100 % | SYSTOLIC BLOOD PRESSURE: 109 MMHG | BODY MASS INDEX: 31.49 KG/M2 | RESPIRATION RATE: 20 BRPM | HEIGHT: 65 IN | WEIGHT: 189 LBS | TEMPERATURE: 98.4 F

## 2024-06-23 DIAGNOSIS — R11.2 NAUSEA VOMITING AND DIARRHEA: Primary | ICD-10-CM

## 2024-06-23 DIAGNOSIS — R19.7 NAUSEA VOMITING AND DIARRHEA: Primary | ICD-10-CM

## 2024-06-23 LAB
ALBUMIN SERPL-MCNC: 4.2 G/DL (ref 3.4–5)
ALP SERPL-CCNC: 69 U/L (ref 40–129)
ALT SERPL-CCNC: 15 U/L (ref 10–40)
ANION GAP SERPL CALCULATED.3IONS-SCNC: 9 MMOL/L (ref 3–16)
AST SERPL-CCNC: 20 U/L (ref 15–37)
BACTERIA URNS QL MICRO: ABNORMAL /HPF
BASOPHILS # BLD: 0 K/UL (ref 0–0.2)
BASOPHILS NFR BLD: 0.5 %
BILIRUB DIRECT SERPL-MCNC: <0.2 MG/DL (ref 0–0.3)
BILIRUB INDIRECT SERPL-MCNC: NORMAL MG/DL (ref 0–1)
BILIRUB SERPL-MCNC: 0.3 MG/DL (ref 0–1)
BILIRUB UR QL STRIP.AUTO: NEGATIVE
BUN SERPL-MCNC: 7 MG/DL (ref 7–20)
CALCIUM SERPL-MCNC: 8.8 MG/DL (ref 8.3–10.6)
CHLORIDE SERPL-SCNC: 109 MMOL/L (ref 99–110)
CLARITY UR: CLEAR
CO2 SERPL-SCNC: 21 MMOL/L (ref 21–32)
COLOR UR: YELLOW
CREAT SERPL-MCNC: 0.6 MG/DL (ref 0.6–1.1)
DEPRECATED RDW RBC AUTO: 13.8 % (ref 12.4–15.4)
EOSINOPHIL # BLD: 0 K/UL (ref 0–0.6)
EOSINOPHIL NFR BLD: 0.7 %
EPI CELLS #/AREA URNS HPF: ABNORMAL /HPF (ref 0–5)
GFR SERPLBLD CREATININE-BSD FMLA CKD-EPI: >90 ML/MIN/{1.73_M2}
GLUCOSE SERPL-MCNC: 81 MG/DL (ref 70–99)
GLUCOSE UR STRIP.AUTO-MCNC: NEGATIVE MG/DL
HCG UR QL: NEGATIVE
HCT VFR BLD AUTO: 38 % (ref 36–48)
HGB BLD-MCNC: 12.7 G/DL (ref 12–16)
HGB UR QL STRIP.AUTO: NEGATIVE
KETONES UR STRIP.AUTO-MCNC: NEGATIVE MG/DL
LEUKOCYTE ESTERASE UR QL STRIP.AUTO: NEGATIVE
LIPASE SERPL-CCNC: 26 U/L (ref 13–60)
LYMPHOCYTES # BLD: 1.3 K/UL (ref 1–5.1)
LYMPHOCYTES NFR BLD: 20.9 %
MCH RBC QN AUTO: 30.9 PG (ref 26–34)
MCHC RBC AUTO-ENTMCNC: 33.4 G/DL (ref 31–36)
MCV RBC AUTO: 92.4 FL (ref 80–100)
MONOCYTES # BLD: 0.5 K/UL (ref 0–1.3)
MONOCYTES NFR BLD: 7.4 %
MUCOUS THREADS #/AREA URNS LPF: ABNORMAL /LPF
NEUTROPHILS # BLD: 4.5 K/UL (ref 1.7–7.7)
NEUTROPHILS NFR BLD: 70.5 %
NITRITE UR QL STRIP.AUTO: NEGATIVE
PH UR STRIP.AUTO: 7 [PH] (ref 5–8)
PLATELET # BLD AUTO: 407 K/UL (ref 135–450)
PMV BLD AUTO: 7.4 FL (ref 5–10.5)
POTASSIUM SERPL-SCNC: 4.3 MMOL/L (ref 3.5–5.1)
PROT SERPL-MCNC: 7.2 G/DL (ref 6.4–8.2)
PROT UR STRIP.AUTO-MCNC: ABNORMAL MG/DL
RBC # BLD AUTO: 4.12 M/UL (ref 4–5.2)
RBC #/AREA URNS HPF: ABNORMAL /HPF (ref 0–4)
SODIUM SERPL-SCNC: 139 MMOL/L (ref 136–145)
SP GR UR STRIP.AUTO: 1.02 (ref 1–1.03)
UA COMPLETE W REFLEX CULTURE PNL UR: ABNORMAL
UA DIPSTICK W REFLEX MICRO PNL UR: YES
URN SPEC COLLECT METH UR: ABNORMAL
UROBILINOGEN UR STRIP-ACNC: 1 E.U./DL
WBC # BLD AUTO: 6.4 K/UL (ref 4–11)
WBC #/AREA URNS HPF: ABNORMAL /HPF (ref 0–5)

## 2024-06-23 PROCEDURE — 99284 EMERGENCY DEPT VISIT MOD MDM: CPT

## 2024-06-23 PROCEDURE — 81001 URINALYSIS AUTO W/SCOPE: CPT

## 2024-06-23 PROCEDURE — 83690 ASSAY OF LIPASE: CPT

## 2024-06-23 PROCEDURE — 2580000003 HC RX 258: Performed by: PHYSICIAN ASSISTANT

## 2024-06-23 PROCEDURE — 36415 COLL VENOUS BLD VENIPUNCTURE: CPT

## 2024-06-23 PROCEDURE — 80048 BASIC METABOLIC PNL TOTAL CA: CPT

## 2024-06-23 PROCEDURE — 85025 COMPLETE CBC W/AUTO DIFF WBC: CPT

## 2024-06-23 PROCEDURE — 80076 HEPATIC FUNCTION PANEL: CPT

## 2024-06-23 PROCEDURE — 84703 CHORIONIC GONADOTROPIN ASSAY: CPT

## 2024-06-23 RX ORDER — SODIUM CHLORIDE, SODIUM LACTATE, POTASSIUM CHLORIDE, AND CALCIUM CHLORIDE .6; .31; .03; .02 G/100ML; G/100ML; G/100ML; G/100ML
1000 INJECTION, SOLUTION INTRAVENOUS ONCE
Status: COMPLETED | OUTPATIENT
Start: 2024-06-23 | End: 2024-06-23

## 2024-06-23 RX ADMIN — SODIUM CHLORIDE, POTASSIUM CHLORIDE, SODIUM LACTATE AND CALCIUM CHLORIDE 1000 ML: 600; 310; 30; 20 INJECTION, SOLUTION INTRAVENOUS at 12:24

## 2024-06-23 ASSESSMENT — PAIN - FUNCTIONAL ASSESSMENT: PAIN_FUNCTIONAL_ASSESSMENT: 0-10

## 2024-06-23 ASSESSMENT — LIFESTYLE VARIABLES
HOW OFTEN DO YOU HAVE A DRINK CONTAINING ALCOHOL: NEVER
HOW MANY STANDARD DRINKS CONTAINING ALCOHOL DO YOU HAVE ON A TYPICAL DAY: PATIENT DOES NOT DRINK

## 2024-06-23 ASSESSMENT — PAIN SCALES - GENERAL: PAINLEVEL_OUTOF10: 0

## 2024-06-23 NOTE — ED PROVIDER NOTES
[facility]  EMERGENCY DEPARTMENT ENCOUNTER          PHYSICIAN ASSISTANT NOTE       Date of evaluation: 6/23/2024    Chief Complaint     Nausea and Diarrhea (Patient comes to the ED today for Diarrhea and nausea times two weeks. Patient states that she has her good and bad days. Patient was advised to come to the ED for further evaluation. )    History of Present Illness     Brandi Rose is a 39 y.o. female who presents to the emergency department with a past medical history of migraines, eczema who presents emergency department with a chief complaint of nausea and diarrhea for the past 12 days.  The patient states that she remembers that this initially started when she cooked a pork sausage that she states was completely cooked and had some corn and for the next several days she had diarrhea and nonbloody, nonbilious emesis.  Patient states that she took some Imodium and had been taking some Zofran/Phenergan that she has prescribed for her migraines and started to feel better.  Then she states that a couple days later she had all the symptoms come back.  She states that yesterday she took an Imodium and has not had diarrhea since.  She has had some nausea but has not taken medications since yesterday.  The patient states that her symptoms seem to be intermittent.  She only has abdominal pain when she is vomiting or having a bowel movement.  At rest currently she does not have any abdominal pain. No fevers or chills.  No chest pain or shortness of breath.  No urinary symptoms such as dysuria or urinary frequency.  No abnormal vaginal discharge.  No concerns for pregnancy or STDs.  She has an IUD in place.  Denies any blood in stool or vomit during the entire course of her symptoms.  She denies any recent antibiotic use within the past several months.  She states that in March she traveled to [place] but has not had any recent travel between that time.    ASSESSMENT / PLAN  (MEDICAL DECISION MAKING)

## 2024-06-23 NOTE — ED PROVIDER NOTES
ED Attending Attestation Note     Date of evaluation: 6/23/2024    This patient was seen by the advance practice provider.  I have seen and examined the patient, agree with the workup, evaluation, management and diagnosis. The care plan has been discussed.  My assessment reveals 39-year-old female who presents with 2 weeks of diarrhea.  Patient has been taking Imodium at home.  Has not had any diarrhea today.  Abdomen is soft nontender nondistended.  Labs including metabolic panel, hepatic function panel, CBC, urinalysis, pregnancy test unremarkable.  Benign abdomen.  Possible resolution of her symptoms.  Unable to provide stool sample here in the ED.  Counseled her on follow-up with primary care provider return precaution discussed with the patient.  Stable for outpatient..    Medical Decision Making  Problems Addressed:  Nausea vomiting and diarrhea: complicated acute illness or injury that poses a threat to life or bodily functions    Amount and/or Complexity of Data Reviewed  Labs: ordered. Decision-making details documented in ED Course.    Risk  Prescription drug management.  Decision regarding hospitalization.             Jaylan Dai MD  06/23/24 1843

## 2024-06-23 NOTE — DISCHARGE INSTRUCTIONS
You are seen in the emergency department with nausea, diarrhea and vomiting.    Your labs look very reassuring no evidence for significant dehydration.  You are doing very well with rehydrating yourself.    Your urine sample does not look infected and your pregnancy test is negative.  Liver and pancreas testing look reassuring.  Your blood counts are also reassuring.    Would recommend that you follow-up with your primary care doctor as they may want to do stool studies when you are able to provide a sample.     Otherwise feel you are stable for discharge.  Continue taking Zofran as needed.  You have 1 refill on your prescription that was filled.

## 2024-08-01 ENCOUNTER — TELEMEDICINE (OUTPATIENT)
Dept: PRIMARY CARE CLINIC | Age: 39
End: 2024-08-01
Payer: MEDICAID

## 2024-08-01 DIAGNOSIS — M25.572 ACUTE LEFT ANKLE PAIN: Primary | ICD-10-CM

## 2024-08-01 PROCEDURE — G8417 CALC BMI ABV UP PARAM F/U: HCPCS | Performed by: NURSE PRACTITIONER

## 2024-08-01 PROCEDURE — G8427 DOCREV CUR MEDS BY ELIG CLIN: HCPCS | Performed by: NURSE PRACTITIONER

## 2024-08-01 PROCEDURE — 1036F TOBACCO NON-USER: CPT | Performed by: NURSE PRACTITIONER

## 2024-08-01 PROCEDURE — 99213 OFFICE O/P EST LOW 20 MIN: CPT | Performed by: NURSE PRACTITIONER

## 2024-08-01 NOTE — PROGRESS NOTES
Brandi Rose, was evaluated through a synchronous (real-time) audio-video encounter. The patient (or guardian if applicable) is aware that this is a billable service, which includes applicable co-pays. This Virtual Visit was conducted with patient's (and/or legal guardian's) consent. Patient identification was verified, and a caregiver was present when appropriate.   The patient was located at Home: [address] [facility] [address]  Provider was located at Facility (Appt Dept): [address] [place] [address] [place] [address]  Confirm you are appropriately licensed, registered, or certified to deliver care in the state where the patient is located as indicated above. If you are not or unsure, please re-schedule the visit: Yes, I confirm.     Brandi Rose (:  1985) is a Established patient, presenting virtually for evaluation of the following:    Assessment & Plan   Below is the assessment and plan developed based on review of pertinent history, physical exam, labs, studies, and medications.  1. Acute left ankle pain  -     XR ANKLE LEFT (2 VIEWS); Future    No follow-ups on file.       Subjective   HPI  Patient presents with complains of left ankle pain; she stated she also needed a letter to be able to wear comfortable shoes for her job; patient stated ankle pain has been ongoing for a couple of weeks and doesn't recall any trauma that could have caused that.  Review of Systems   Constitutional: Negative.    HENT: Negative.     Eyes: Negative.    Respiratory: Negative.     Cardiovascular: Negative.    Gastrointestinal: Negative.    Endocrine: Negative.    Genitourinary: Negative.    Musculoskeletal: Negative.         Joint pain   Skin: Negative.    Neurological: Negative.    Hematological: Negative.    Psychiatric/Behavioral: Negative.            Objective   Patient-Reported Vitals  Patient-Reported Weight: 190  Patient-Reported Height: 5'5\"       Physical Exam  [INSTRUCTIONS:

## 2024-08-09 ENCOUNTER — HOSPITAL ENCOUNTER (OUTPATIENT)
Dept: GENERAL RADIOLOGY | Age: 39
Discharge: HOME | End: 2024-08-09
Payer: MEDICAID

## 2024-08-09 ENCOUNTER — HOSPITAL ENCOUNTER (OUTPATIENT)
Age: 39
Discharge: HOME | End: 2024-08-09
Payer: MEDICAID

## 2024-08-09 DIAGNOSIS — G43.709 CHRONIC MIGRAINE WITHOUT AURA WITHOUT STATUS MIGRAINOSUS, NOT INTRACTABLE: ICD-10-CM

## 2024-08-09 DIAGNOSIS — M25.572 ACUTE LEFT ANKLE PAIN: ICD-10-CM

## 2024-08-09 PROCEDURE — 73610 X-RAY EXAM OF ANKLE: CPT

## 2024-08-09 NOTE — TELEPHONE ENCOUNTER
Medication:   Requested Prescriptions     Pending Prescriptions Disp Refills    ibuprofen (ADVIL;MOTRIN) 800 MG tablet 120 tablet 0     Sig: Take 1 tablet by mouth every 8 hours as needed for Pain    ondansetron (ZOFRAN-ODT) 8 MG TBDP disintegrating tablet 30 tablet 1     Sig: Take 0.5 tablets by mouth every 8 hours as needed for Nausea or Vomiting        Last Filled:  01/05/24, 04/04/2024    Patient Phone Number: [phone] (home)     Last appt: 8/1/2024   Next appt: Visit date not found    Last OARRS:        No data to display

## 2024-08-12 RX ORDER — ONDANSETRON 8 MG/1
4 TABLET, ORALLY DISINTEGRATING ORAL EVERY 8 HOURS PRN
Qty: 30 TABLET | Refills: 1 | Status: SHIPPED | OUTPATIENT
Start: 2024-08-12

## 2024-08-12 RX ORDER — IBUPROFEN 800 MG/1
800 TABLET ORAL EVERY 8 HOURS PRN
Qty: 120 TABLET | Refills: 0 | Status: SHIPPED | OUTPATIENT
Start: 2024-08-12

## 2024-08-12 ASSESSMENT — ENCOUNTER SYMPTOMS
EYES NEGATIVE: 1
RESPIRATORY NEGATIVE: 1
GASTROINTESTINAL NEGATIVE: 1

## 2024-09-10 ENCOUNTER — TELEPHONE (OUTPATIENT)
Dept: PRIMARY CARE CLINIC | Age: 39
End: 2024-09-10

## 2024-09-27 ENCOUNTER — TELEPHONE (OUTPATIENT)
Dept: ADMINISTRATIVE | Age: 39
End: 2024-09-27

## 2024-09-27 DIAGNOSIS — L30.8 OTHER ECZEMA: ICD-10-CM

## 2024-09-27 DIAGNOSIS — G43.019 INTRACTABLE MIGRAINE WITHOUT AURA AND WITHOUT STATUS MIGRAINOSUS: ICD-10-CM

## 2024-09-27 RX ORDER — TRIAMCINOLONE ACETONIDE 1 MG/G
OINTMENT TOPICAL 2 TIMES DAILY
Qty: 454 G | Refills: 2 | Status: SHIPPED | OUTPATIENT
Start: 2024-09-27

## 2024-09-27 RX ORDER — PROMETHAZINE HYDROCHLORIDE 25 MG/1
12.5-25 TABLET ORAL EVERY 6 HOURS PRN
Qty: 10 TABLET | Refills: 3 | Status: SHIPPED | OUTPATIENT
Start: 2024-09-27

## 2024-09-27 RX ORDER — IBUPROFEN 800 MG/1
800 TABLET, FILM COATED ORAL EVERY 8 HOURS PRN
Qty: 120 TABLET | Refills: 0 | Status: SHIPPED | OUTPATIENT
Start: 2024-09-27

## 2024-09-27 NOTE — TELEPHONE ENCOUNTER
Submitted PA for Spironolactone 25MG tablets  Via CMM Key: [id number] STATUS: PA Not Required    If this requires a response please respond to the pool ( P [facility] MEDICATION PRE-AUTH).      Thank you please advise patient.

## 2024-12-17 ENCOUNTER — TELEMEDICINE (OUTPATIENT)
Dept: PRIMARY CARE CLINIC | Age: 39
End: 2024-12-17
Payer: MEDICAID

## 2024-12-17 DIAGNOSIS — J40 BRONCHITIS: Primary | ICD-10-CM

## 2024-12-17 PROCEDURE — 1036F TOBACCO NON-USER: CPT | Performed by: NURSE PRACTITIONER

## 2024-12-17 PROCEDURE — 99213 OFFICE O/P EST LOW 20 MIN: CPT | Performed by: NURSE PRACTITIONER

## 2024-12-17 PROCEDURE — G8484 FLU IMMUNIZE NO ADMIN: HCPCS | Performed by: NURSE PRACTITIONER

## 2024-12-17 PROCEDURE — G8427 DOCREV CUR MEDS BY ELIG CLIN: HCPCS | Performed by: NURSE PRACTITIONER

## 2024-12-17 PROCEDURE — G8417 CALC BMI ABV UP PARAM F/U: HCPCS | Performed by: NURSE PRACTITIONER

## 2024-12-17 SDOH — ECONOMIC STABILITY: FOOD INSECURITY: WITHIN THE PAST 12 MONTHS, THE FOOD YOU BOUGHT JUST DIDN'T LAST AND YOU DIDN'T HAVE MONEY TO GET MORE.: NEVER TRUE

## 2024-12-17 SDOH — ECONOMIC STABILITY: INCOME INSECURITY: HOW HARD IS IT FOR YOU TO PAY FOR THE VERY BASICS LIKE FOOD, HOUSING, MEDICAL CARE, AND HEATING?: NOT HARD AT ALL

## 2024-12-17 SDOH — ECONOMIC STABILITY: FOOD INSECURITY: WITHIN THE PAST 12 MONTHS, YOU WORRIED THAT YOUR FOOD WOULD RUN OUT BEFORE YOU GOT MONEY TO BUY MORE.: NEVER TRUE

## 2024-12-17 NOTE — PROGRESS NOTES
Brandi Rose, was evaluated through a synchronous (real-time) audio-video encounter. The patient (or guardian if applicable) is aware that this is a billable service, which includes applicable co-pays. This Virtual Visit was conducted with patient's (and/or legal guardian's) consent. Patient identification was verified, and a caregiver was present when appropriate.   The patient was located at Home: [address] [facility] [address]  Provider was located at Facility (Appt Dept): [address] [place] [address] [place] [address]  Confirm you are appropriately licensed, registered, or certified to deliver care in the state where the patient is located as indicated above. If you are not or unsure, please re-schedule the visit: Yes, I confirm.     Brandi Rose (:  1985) is a Established patient, presenting virtually for evaluation of the following:      Below is the assessment and plan developed based on review of pertinent history, physical exam, labs, studies, and medications.     Assessment & Plan  Bronchitis   Acute condition, new, Supportive care with appropriate antipyretics and fluids.           No follow-ups on file.       Subjective   HPI  Patient presents with complaint of a cough ongoing for 2 weeks which started after a URI resolved.  Patient stated that the cough is not causing her to have any fevers or discomfort but has been ongoing for a while so she wanted to make sure everything was fine.  She complains of cough and postnasal drip.  She denies any wheezing or chest pain.  Discussed with patient possible bronchitis and stated to monitor and if symptoms do not resolve in the week or worsens to call and get a dose of steroids sent in for patient.  Review of Systems       Objective   Patient-Reported Vitals  No data recorded     Physical Exam  [INSTRUCTIONS:  \"[x]\" Indicates a positive item  \"[]\" Indicates a negative item  -- DELETE ALL ITEMS NOT

## 2024-12-31 DIAGNOSIS — G43.709 CHRONIC MIGRAINE WITHOUT AURA WITHOUT STATUS MIGRAINOSUS, NOT INTRACTABLE: ICD-10-CM

## 2024-12-31 RX ORDER — IBUPROFEN 800 MG/1
800 TABLET, FILM COATED ORAL EVERY 8 HOURS PRN
Qty: 30 TABLET | Refills: 0 | Status: SHIPPED | OUTPATIENT
Start: 2024-12-31

## 2024-12-31 RX ORDER — ONDANSETRON 8 MG/1
TABLET, ORALLY DISINTEGRATING ORAL
Qty: 20 TABLET | Refills: 0 | Status: SHIPPED | OUTPATIENT
Start: 2024-12-31

## 2025-01-24 ENCOUNTER — TELEMEDICINE (OUTPATIENT)
Dept: PRIMARY CARE CLINIC | Age: 40
End: 2025-01-24
Payer: MEDICAID

## 2025-01-24 DIAGNOSIS — M25.571 ACUTE RIGHT ANKLE PAIN: Primary | ICD-10-CM

## 2025-01-24 PROCEDURE — G8417 CALC BMI ABV UP PARAM F/U: HCPCS | Performed by: NURSE PRACTITIONER

## 2025-01-24 PROCEDURE — G8427 DOCREV CUR MEDS BY ELIG CLIN: HCPCS | Performed by: NURSE PRACTITIONER

## 2025-01-24 PROCEDURE — 1036F TOBACCO NON-USER: CPT | Performed by: NURSE PRACTITIONER

## 2025-01-24 PROCEDURE — 99213 OFFICE O/P EST LOW 20 MIN: CPT | Performed by: NURSE PRACTITIONER

## 2025-01-24 RX ORDER — MELOXICAM 15 MG/1
15 TABLET ORAL DAILY
Qty: 30 TABLET | Refills: 0 | Status: SHIPPED | OUTPATIENT
Start: 2025-01-24

## 2025-01-24 SDOH — ECONOMIC STABILITY: FOOD INSECURITY: WITHIN THE PAST 12 MONTHS, THE FOOD YOU BOUGHT JUST DIDN'T LAST AND YOU DIDN'T HAVE MONEY TO GET MORE.: NEVER TRUE

## 2025-01-24 SDOH — ECONOMIC STABILITY: FOOD INSECURITY: WITHIN THE PAST 12 MONTHS, YOU WORRIED THAT YOUR FOOD WOULD RUN OUT BEFORE YOU GOT MONEY TO BUY MORE.: NEVER TRUE

## 2025-01-24 ASSESSMENT — PATIENT HEALTH QUESTIONNAIRE - PHQ9
SUM OF ALL RESPONSES TO PHQ9 QUESTIONS 1 & 2: 0
SUM OF ALL RESPONSES TO PHQ QUESTIONS 1-9: 0
2. FEELING DOWN, DEPRESSED OR HOPELESS: NOT AT ALL
1. LITTLE INTEREST OR PLEASURE IN DOING THINGS: NOT AT ALL
SUM OF ALL RESPONSES TO PHQ QUESTIONS 1-9: 0

## 2025-01-24 NOTE — PROGRESS NOTES
Brandi Rose, was evaluated through a synchronous (real-time) audio-video encounter. The patient (or guardian if applicable) is aware that this is a billable service, which includes applicable co-pays. This Virtual Visit was conducted with patient's (and/or legal guardian's) consent. Patient identification was verified, and a caregiver was present when appropriate.   The patient was located at Home: [address] [facility] [address]  Provider was located at Facility (Appt Dept): [address] [place] [address] [place] [address]  Confirm you are appropriately licensed, registered, or certified to deliver care in the state where the patient is located as indicated above. If you are not or unsure, please re-schedule the visit: Yes, I confirm.     Brandi Rose (:  1985) is a Established patient, presenting virtually for evaluation of the following:      Below is the assessment and plan developed based on review of pertinent history, physical exam, labs, studies, and medications.     Assessment & Plan  Acute right ankle pain         Orders:    XR ANKLE RIGHT (2 VIEWS); Future    meloxicam (MOBIC) 15 MG tablet; Take 1 tablet by mouth daily      No follow-ups on file.       Subjective   HPI  Patient presents with complaints of right ankle pain that started a couple of weeks ago .  Pain is on the lateral front side and is worse on the frontal side of her ankle.  She denies any trauma.  A few months ago she had similar pain on the other ankle.x-ray did not show anything remarkable. Advised we will have ankle x-rayed to see if there is any acute injuries.    Review of Systems   Constitutional: Negative.    HENT: Negative.     Eyes: Negative.    Respiratory: Negative.     Cardiovascular: Negative.    Gastrointestinal: Negative.    Endocrine: Negative.    Genitourinary: Negative.    Musculoskeletal:  Positive for arthralgias.   Skin: Negative.    Neurological: Negative.    Hematological:

## 2025-01-29 ASSESSMENT — ENCOUNTER SYMPTOMS
RESPIRATORY NEGATIVE: 1
GASTROINTESTINAL NEGATIVE: 1
EYES NEGATIVE: 1

## 2025-02-04 DIAGNOSIS — G43.709 CHRONIC MIGRAINE WITHOUT AURA WITHOUT STATUS MIGRAINOSUS, NOT INTRACTABLE: ICD-10-CM

## 2025-02-04 RX ORDER — ONDANSETRON 8 MG/1
TABLET, ORALLY DISINTEGRATING ORAL
Qty: 20 TABLET | Refills: 0 | Status: SHIPPED | OUTPATIENT
Start: 2025-02-04 | End: 2025-02-08 | Stop reason: SDUPTHER

## 2025-02-04 NOTE — TELEPHONE ENCOUNTER
Medication:   Requested Prescriptions     Pending Prescriptions Disp Refills    ondansetron (ZOFRAN-ODT) 8 MG TBDP disintegrating tablet [Pharmacy Med Name: ONDANSETRON ODT 8 MG TABLET] 20 tablet 0     Sig: DISSOLVE HALF A TABLET BY MOUTH EVERY 8 HOURS AS NEEDED FOR NAUSEA OR VOMITING     Last Filled:  12/31/24    Last appt: 1/24/2025   Next appt: Visit date not found    Last OARRS:        No data to display

## 2025-02-07 DIAGNOSIS — G43.709 CHRONIC MIGRAINE WITHOUT AURA WITHOUT STATUS MIGRAINOSUS, NOT INTRACTABLE: ICD-10-CM

## 2025-02-08 DIAGNOSIS — L30.8 OTHER ECZEMA: ICD-10-CM

## 2025-02-08 DIAGNOSIS — G43.019 INTRACTABLE MIGRAINE WITHOUT AURA AND WITHOUT STATUS MIGRAINOSUS: ICD-10-CM

## 2025-02-08 DIAGNOSIS — G43.709 CHRONIC MIGRAINE WITHOUT AURA WITHOUT STATUS MIGRAINOSUS, NOT INTRACTABLE: ICD-10-CM

## 2025-02-08 RX ORDER — ONDANSETRON 8 MG/1
4 TABLET, ORALLY DISINTEGRATING ORAL EVERY 12 HOURS PRN
Qty: 20 TABLET | Refills: 0 | Status: SHIPPED | OUTPATIENT
Start: 2025-02-08

## 2025-02-08 RX ORDER — PROMETHAZINE HYDROCHLORIDE 25 MG/1
12.5-25 TABLET ORAL EVERY 6 HOURS PRN
Qty: 10 TABLET | Refills: 1 | Status: SHIPPED | OUTPATIENT
Start: 2025-02-08

## 2025-02-08 RX ORDER — ONDANSETRON 8 MG/1
TABLET, ORALLY DISINTEGRATING ORAL
Qty: 20 TABLET | Refills: 0 | OUTPATIENT
Start: 2025-02-08

## 2025-02-08 RX ORDER — TRIAMCINOLONE ACETONIDE 1 MG/G
OINTMENT TOPICAL 2 TIMES DAILY
Qty: 454 G | Refills: 0 | Status: SHIPPED | OUTPATIENT
Start: 2025-02-08

## 2025-02-18 ENCOUNTER — TELEMEDICINE (OUTPATIENT)
Dept: PRIMARY CARE CLINIC | Age: 40
End: 2025-02-18
Payer: MEDICAID

## 2025-02-18 DIAGNOSIS — K08.89 TOOTH PAIN: Primary | ICD-10-CM

## 2025-02-18 PROCEDURE — 99213 OFFICE O/P EST LOW 20 MIN: CPT | Performed by: NURSE PRACTITIONER

## 2025-02-18 PROCEDURE — 1036F TOBACCO NON-USER: CPT | Performed by: NURSE PRACTITIONER

## 2025-02-18 PROCEDURE — G8427 DOCREV CUR MEDS BY ELIG CLIN: HCPCS | Performed by: NURSE PRACTITIONER

## 2025-02-18 PROCEDURE — G8417 CALC BMI ABV UP PARAM F/U: HCPCS | Performed by: NURSE PRACTITIONER

## 2025-02-18 ASSESSMENT — ENCOUNTER SYMPTOMS
EYES NEGATIVE: 1
GASTROINTESTINAL NEGATIVE: 1
RESPIRATORY NEGATIVE: 1

## 2025-02-18 NOTE — PROGRESS NOTES
200  Patient-Reported Height: 5'5\"       Physical Exam  [INSTRUCTIONS:  \"[x]\" Indicates a positive item  \"[]\" Indicates a negative item  -- DELETE ALL ITEMS NOT EXAMINED]    Constitutional: [x] Appears well-developed and well-nourished [x] No apparent distress      [] Abnormal -     Mental status: [x] Alert and awake  [x] Oriented to person/place/time [x] Able to follow commands    [] Abnormal -     Eyes:   EOM    [x]  Normal    [] Abnormal -   Sclera  [x]  Normal    [] Abnormal -          Discharge [x]  None visible   [] Abnormal -     HENT: [x] Normocephalic, atraumatic  [] Abnormal -   [x] Mouth/Throat: Mucous membranes are moist    External Ears [x] Normal  [] Abnormal -    Neck: [x] No visualized mass [] Abnormal -     Pulmonary/Chest: [x] Respiratory effort normal   [x] No visualized signs of difficulty breathing or respiratory distress        [] Abnormal -      Musculoskeletal:   [x] Normal gait with no signs of ataxia         [x] Normal range of motion of neck        [] Abnormal -     Neurological:        [x] No Facial Asymmetry (Cranial nerve 7 motor function) (limited exam due to video visit)          [x] No gaze palsy        [] Abnormal -          Skin:        [x] No significant exanthematous lesions or discoloration noted on facial skin         [] Abnormal -            Psychiatric:       [x] Normal Affect [] Abnormal -        [x] No Hallucinations    Other pertinent observable physical exam findings:-         On this date 2/18/2025 I have spent 30 minutes reviewing previous notes, test results and face to face (virtual) with the patient discussing the diagnosis and importance of compliance with the treatment plan as well as documenting on the day of the visit.    --Libia Metz, LOPEZ - CNP

## 2025-02-20 ENCOUNTER — OFFICE VISIT (OUTPATIENT)
Age: 40
End: 2025-02-20

## 2025-02-20 VITALS
BODY MASS INDEX: 36.19 KG/M2 | SYSTOLIC BLOOD PRESSURE: 113 MMHG | HEART RATE: 81 BPM | TEMPERATURE: 98.4 F | OXYGEN SATURATION: 96 % | HEIGHT: 65 IN | DIASTOLIC BLOOD PRESSURE: 76 MMHG | WEIGHT: 217.2 LBS

## 2025-02-20 DIAGNOSIS — G43.909 MIGRAINE SYNDROME: Primary | ICD-10-CM

## 2025-02-20 RX ORDER — KETOROLAC TROMETHAMINE 30 MG/ML
60 INJECTION, SOLUTION INTRAMUSCULAR; INTRAVENOUS ONCE
Status: COMPLETED | OUTPATIENT
Start: 2025-02-20 | End: 2025-02-20

## 2025-02-20 RX ADMIN — KETOROLAC TROMETHAMINE 60 MG: 30 INJECTION, SOLUTION INTRAMUSCULAR; INTRAVENOUS at 15:00

## 2025-02-20 NOTE — PROGRESS NOTES
[facility]    Brandi Rose (:  1985 MRN: [id number]) is a 39 y.o. female, here for evaluation of the following chief complaint(s):  Headache (Pt here for migran headache for past 3 days ./)    ASSESSMENT/PLAN:    ICD-10-CM    1. Migraine syndrome  G43.909 ketorolac (TORADOL) injection 60 mg         New Prescriptions    No medications on file     Summary  - Toradol injection provided today.   - Follow up established to be on an as-needed basis.  - I explained the warning signs of when to go to the emergency room.    Differentials: intracranial hemorrhage, trigeminal neuralgia, tumor, aneurysm, migraine, CVA/TIA, contusion of head, giant cell arteritis.    SUBJECTIVE/OBJECTIVE:  VIOLA Paz presents to the clinic for a headache which is described as bilateral in the occipital area Onset of symptoms was 3 days ago.Associated symptoms: nausea (which she says is from the Fioricet as a side effect and does have phenergan at home), stiff neck, and photophobia and phonophobia. She uses fioricet but it hasn't been working.     Vitals:    25 1005   BP: 113/76   Pulse: 81   Temp: 98.4 °F (36.9 °C)   TempSrc: Oral   SpO2: 96%   Weight: 98.5 kg (217 lb 3.2 oz)   Height: 1.651 m (5' 5\")       No results found for this visit on 25.     No orders to display       Review of Systems  PHYSICAL EXAM  Physical Exam  Vitals reviewed.   HENT:      Head: Normocephalic and atraumatic.      Mouth/Throat:      Mouth: Mucous membranes are moist.   Eyes:      General:         Right eye: No discharge.         Left eye: No discharge.      Pupils: Pupils are equal, round, and reactive to light.   Pulmonary:      Effort: Pulmonary effort is normal.   Musculoskeletal:      Cervical back: Neck supple.   Skin:     General: Skin is warm.   Neurological:      Mental Status: She is alert and oriented to person, place, and time.   Psychiatric:         Mood and Affect: Affect is flat.         Behavior: Behavior

## 2025-04-14 DIAGNOSIS — G43.709 CHRONIC MIGRAINE WITHOUT AURA WITHOUT STATUS MIGRAINOSUS, NOT INTRACTABLE: ICD-10-CM

## 2025-04-14 NOTE — TELEPHONE ENCOUNTER
Medication:   Requested Prescriptions     Pending Prescriptions Disp Refills    ondansetron (ZOFRAN-ODT) 8 MG TBDP disintegrating tablet [Pharmacy Med Name: ONDANSETRON ODT 8 MG TABLET] 20 tablet 0     Sig: DISSOLVE A HALF TABLET BY MOUTH EVERY 12 HOURS AS NEEDED FOR NAUSEA AND/OR VOMITING        Last Filled:  2/8/2025     Patient Phone Number: [phone] (home) [phone] (work)    Last appt: 2/18/2025   Next appt: Visit date not found    Last OARRS:        No data to display

## 2025-04-15 RX ORDER — ONDANSETRON 8 MG/1
TABLET, ORALLY DISINTEGRATING ORAL
Qty: 20 TABLET | Refills: 0 | Status: SHIPPED | OUTPATIENT
Start: 2025-04-15

## 2025-04-16 DIAGNOSIS — G43.709 CHRONIC MIGRAINE WITHOUT AURA WITHOUT STATUS MIGRAINOSUS, NOT INTRACTABLE: ICD-10-CM

## 2025-04-16 RX ORDER — ONDANSETRON 8 MG/1
TABLET, ORALLY DISINTEGRATING ORAL
Qty: 20 TABLET | Refills: 0 | OUTPATIENT
Start: 2025-04-16

## 2025-06-09 DIAGNOSIS — L30.8 OTHER ECZEMA: ICD-10-CM

## 2025-06-09 DIAGNOSIS — G43.709 CHRONIC MIGRAINE WITHOUT AURA WITHOUT STATUS MIGRAINOSUS, NOT INTRACTABLE: ICD-10-CM

## 2025-06-09 NOTE — TELEPHONE ENCOUNTER
Medication:   Requested Prescriptions     Pending Prescriptions Disp Refills    triamcinolone (KENALOG) 0.1 % ointment [Pharmacy Med Name: TRIAMCINOLONE 0.1% OINTMENT] 454 g 0     Sig: APPLY TO THE AFFECTED AREA(S) 2 TIMES A DAY        Last Filled:  2/8/25    Patient Phone Number: [phone] (home) [phone] (work)    Last appt: 2/18/2025   Next appt: Visit date not found    Last OARRS:        No data to display

## 2025-06-09 NOTE — TELEPHONE ENCOUNTER
Medication:   Requested Prescriptions     Pending Prescriptions Disp Refills    butalbital-acetaminophen-caffeine (FIORICET, ESGIC) -40 MG per tablet [Pharmacy Med Name: BUTALBITAL-ACETAMN-CAF -40 TB] 30 tablet      Sig: TAKE 1 TABLET BY MOUTH EVERY 4 HOURS AS NEEDED FOR HEADACHE OR MIGRAINE        Last Filled:  4/4/24 #30 2 refills    Patient Phone Number: [phone] (home) [phone] (work)    Last appt: 2/18/2025   Next appt: 6/9/2025    Last OARRS:        No data to display

## 2025-06-10 ENCOUNTER — TELEPHONE (OUTPATIENT)
Dept: PRIMARY CARE CLINIC | Age: 40
End: 2025-06-10

## 2025-06-10 DIAGNOSIS — G43.709 CHRONIC MIGRAINE WITHOUT AURA WITHOUT STATUS MIGRAINOSUS, NOT INTRACTABLE: ICD-10-CM

## 2025-06-10 RX ORDER — TRIAMCINOLONE ACETONIDE 1 MG/G
OINTMENT TOPICAL
Qty: 454 G | Refills: 0 | Status: SHIPPED | OUTPATIENT
Start: 2025-06-10

## 2025-06-10 RX ORDER — BUTALBITAL, ACETAMINOPHEN AND CAFFEINE 50; 325; 40 MG/1; MG/1; MG/1
TABLET ORAL
Qty: 30 TABLET | Refills: 1 | Status: SHIPPED | OUTPATIENT
Start: 2025-06-10

## 2025-06-10 NOTE — TELEPHONE ENCOUNTER
Medication and Quantity requested:   ondansetron (ZOFRAN-ODT) 8 MG TBDP disintegrating tablet       Last Visit  2/18/25    Pharmacy and phone number updated in [facility]:  yes  Aditi

## 2025-06-11 RX ORDER — ONDANSETRON 8 MG/1
8 TABLET, ORALLY DISINTEGRATING ORAL EVERY 12 HOURS PRN
Qty: 20 TABLET | Refills: 0 | Status: SHIPPED | OUTPATIENT
Start: 2025-06-11

## 2025-07-10 DIAGNOSIS — L30.8 OTHER ECZEMA: ICD-10-CM

## 2025-07-10 DIAGNOSIS — G43.709 CHRONIC MIGRAINE WITHOUT AURA WITHOUT STATUS MIGRAINOSUS, NOT INTRACTABLE: ICD-10-CM

## 2025-07-10 NOTE — TELEPHONE ENCOUNTER
Medication:   Requested Prescriptions     Pending Prescriptions Disp Refills    ondansetron (ZOFRAN-ODT) 8 MG TBDP disintegrating tablet [Pharmacy Med Name: ONDANSETRON ODT 8 MG TABLET] 20 tablet 0     Sig: DISSOLVE 1 TABLET BY MOUTH EVERY 12 HOURS AS NEEDED FOR NAUSEA AND/OR VOMITING        Last Filled:  6/11/25 #20 0 refill    Patient Phone Number: [phone] (home) [phone] (work)    Last appt: 2/18/2025   Next appt: Visit date not found    Last OARRS:        No data to display

## 2025-07-11 RX ORDER — TRIAMCINOLONE ACETONIDE 1 MG/G
OINTMENT TOPICAL
Qty: 454 G | Refills: 0 | Status: SHIPPED | OUTPATIENT
Start: 2025-07-11

## 2025-07-11 RX ORDER — ONDANSETRON 8 MG/1
TABLET, ORALLY DISINTEGRATING ORAL
Qty: 20 TABLET | Refills: 0 | Status: SHIPPED | OUTPATIENT
Start: 2025-07-11

## 2025-07-14 RX ORDER — IBUPROFEN 800 MG/1
800 TABLET, FILM COATED ORAL EVERY 8 HOURS PRN
Qty: 30 TABLET | Refills: 0 | Status: SHIPPED | OUTPATIENT
Start: 2025-07-14

## 2025-07-14 NOTE — TELEPHONE ENCOUNTER
Medication:   Requested Prescriptions     Pending Prescriptions Disp Refills    ibuprofen (ADVIL;MOTRIN) 800 MG tablet [Pharmacy Med Name: IBUPROFEN 800 MG TABLET] 30 tablet 0     Sig: TAKE 1 TABLET BY MOUTH EVERY 8 HOURS AS NEEDED FOR PAIN        Last Filled:  12/31/2024    Patient Phone Number: [phone] (home) [phone] (work)    Last appt: 2/18/2025   Next appt: Visit date not found    Last OARRS:        No data to display

## 2025-07-18 ENCOUNTER — TELEMEDICINE (OUTPATIENT)
Dept: PRIMARY CARE CLINIC | Age: 40
End: 2025-07-18
Payer: MEDICAID

## 2025-07-18 DIAGNOSIS — L81.9 DISCOLORATION OF SKIN OF FLANK RESEMBLING ECCHYMOSIS: Primary | ICD-10-CM

## 2025-07-18 PROCEDURE — 1036F TOBACCO NON-USER: CPT | Performed by: NURSE PRACTITIONER

## 2025-07-18 PROCEDURE — G8417 CALC BMI ABV UP PARAM F/U: HCPCS | Performed by: NURSE PRACTITIONER

## 2025-07-18 PROCEDURE — 99213 OFFICE O/P EST LOW 20 MIN: CPT | Performed by: NURSE PRACTITIONER

## 2025-07-18 PROCEDURE — G8427 DOCREV CUR MEDS BY ELIG CLIN: HCPCS | Performed by: NURSE PRACTITIONER

## 2025-07-18 ASSESSMENT — ENCOUNTER SYMPTOMS
RESPIRATORY NEGATIVE: 1
EYES NEGATIVE: 1
GASTROINTESTINAL NEGATIVE: 1

## 2025-07-18 NOTE — PROGRESS NOTES
Brandi Rose, was evaluated through a synchronous (real-time) audio-video encounter. The patient (or guardian if applicable) is aware that this is a billable service, which includes applicable co-pays. This Virtual Visit was conducted with patient's (and/or legal guardian's) consent. Patient identification was verified, and a caregiver was present when appropriate.   The patient was located at Home: [address] [facility] [address]  Provider was located at Facility (Appt Dept): [address] [place] [address] [place] [address]  Confirm you are appropriately licensed, registered, or certified to deliver care in the state where the patient is located as indicated above. If you are not or unsure, please re-schedule the visit: Yes, I confirm.     Brandi Rose (:  1985) is a Established patient, presenting virtually for evaluation of the following:      Below is the assessment and plan developed based on review of pertinent history, physical exam, labs, studies, and medications.     Assessment & Plan  Discoloration of skin of flank resembling ecchymosis   Chronic, at goal (stable), continue current treatment plan           No follow-ups on file.       Subjective   HPI  Subjective for patient who presents with complaints of ecchymosis and stated that she had gained some weight due to stress and since then she has noticed that her skin has been having irritation with very minimal contact.  She is trying to lose weight but insurance is requiring a medical necessity letter for home exercise equipment such as weights so she needs a letter from office to send to her insurance company.  Review of Systems   Constitutional: Negative.    HENT: Negative.     Eyes: Negative.    Respiratory: Negative.     Cardiovascular: Negative.    Gastrointestinal: Negative.    Endocrine: Negative.    Genitourinary: Negative.    Musculoskeletal: Negative.    Skin: Negative.    Neurological: Negative.

## 2025-07-21 RX ORDER — IBUPROFEN 800 MG/1
800 TABLET, FILM COATED ORAL EVERY 8 HOURS PRN
Qty: 30 TABLET | Refills: 0 | OUTPATIENT
Start: 2025-07-21

## 2025-09-02 DIAGNOSIS — G43.709 CHRONIC MIGRAINE WITHOUT AURA WITHOUT STATUS MIGRAINOSUS, NOT INTRACTABLE: ICD-10-CM

## 2025-09-02 DIAGNOSIS — L30.8 OTHER ECZEMA: ICD-10-CM

## 2025-09-02 DIAGNOSIS — G43.019 INTRACTABLE MIGRAINE WITHOUT AURA AND WITHOUT STATUS MIGRAINOSUS: ICD-10-CM

## 2025-09-02 RX ORDER — PROMETHAZINE HYDROCHLORIDE 25 MG/1
TABLET ORAL
Qty: 10 TABLET | Refills: 1 | OUTPATIENT
Start: 2025-09-02

## 2025-09-02 RX ORDER — TRIAMCINOLONE ACETONIDE 1 MG/G
OINTMENT TOPICAL
Qty: 454 G | Refills: 0 | OUTPATIENT
Start: 2025-09-02

## 2025-09-02 RX ORDER — IBUPROFEN 800 MG/1
800 TABLET, FILM COATED ORAL EVERY 8 HOURS PRN
Qty: 30 TABLET | Refills: 0 | OUTPATIENT
Start: 2025-09-02

## 2025-09-02 RX ORDER — ONDANSETRON 8 MG/1
8 TABLET, ORALLY DISINTEGRATING ORAL 2 TIMES DAILY
Qty: 20 TABLET | Refills: 0 | OUTPATIENT
Start: 2025-09-02